# Patient Record
Sex: MALE | Race: WHITE | Employment: OTHER | URBAN - METROPOLITAN AREA
[De-identification: names, ages, dates, MRNs, and addresses within clinical notes are randomized per-mention and may not be internally consistent; named-entity substitution may affect disease eponyms.]

---

## 2017-08-29 RX ORDER — NAPROXEN 500 MG/1
TABLET ORAL
Qty: 60 TAB | Refills: 6 | Status: SHIPPED | OUTPATIENT
Start: 2017-08-29 | End: 2018-09-10 | Stop reason: SDUPTHER

## 2017-11-06 PROBLEM — N40.2 PROSTATE NODULE: Status: ACTIVE | Noted: 2017-11-06
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
## 2017-11-06 PROBLEM — H61.23 IMPACTED CERUMEN OF BOTH EARS: Status: ACTIVE | Noted: 2017-11-06

## 2017-11-06 PROBLEM — K63.5 COLON POLYPS: Status: ACTIVE | Noted: 2017-11-06

## 2017-11-06 PROBLEM — I10 HYPERTENSION: Status: ACTIVE | Noted: 2017-11-06

## 2017-11-06 PROBLEM — M25.569 KNEE PAIN: Status: ACTIVE | Noted: 2017-11-06

## 2017-11-06 PROBLEM — N41.0 ACUTE BACTERIAL PROSTATITIS: Status: ACTIVE | Noted: 2017-11-06

## 2017-11-06 RX ORDER — TADALAFIL 5 MG/1
5 TABLET ORAL
COMMUNITY
End: 2017-11-30 | Stop reason: ALTCHOICE

## 2017-11-06 RX ORDER — BISMUTH SUBSALICYLATE 262 MG
1 TABLET,CHEWABLE ORAL DAILY
COMMUNITY

## 2017-11-06 RX ORDER — LISINOPRIL 10 MG/1
TABLET ORAL DAILY
COMMUNITY
End: 2018-01-14 | Stop reason: SDUPTHER

## 2017-11-30 ENCOUNTER — OFFICE VISIT (OUTPATIENT)
Dept: INTERNAL MEDICINE CLINIC | Age: 67
End: 2017-11-30

## 2017-11-30 VITALS
HEIGHT: 71 IN | BODY MASS INDEX: 26.74 KG/M2 | DIASTOLIC BLOOD PRESSURE: 74 MMHG | HEART RATE: 65 BPM | SYSTOLIC BLOOD PRESSURE: 110 MMHG | OXYGEN SATURATION: 96 % | WEIGHT: 191 LBS

## 2017-11-30 DIAGNOSIS — I10 ESSENTIAL HYPERTENSION: Primary | ICD-10-CM

## 2017-11-30 LAB
ALBUMIN SERPL-MCNC: 4.5 G/DL (ref 3.9–5.4)
ALKALINE PHOS POC: 76 U/L (ref 38–126)
ALT SERPL-CCNC: 33 U/L (ref 9–52)
AST SERPL-CCNC: 24 U/L (ref 14–36)
BACTERIA UA POCT, BACTPOCT: NORMAL
BILIRUB UR QL STRIP: NEGATIVE
BUN BLD-MCNC: 25 MG/DL (ref 9–20)
CALCIUM BLD-MCNC: 9.9 MG/DL (ref 8.4–10.2)
CASTS UA POCT: 0
CHLORIDE BLD-SCNC: 102 MMOL/L (ref 98–107)
CHOLEST SERPL-MCNC: 197 MG/DL (ref 0–200)
CLUE CELLS, CLUEPOCT: NEGATIVE
CO2 POC: 30 MMOL/L (ref 22–32)
CREAT BLD-MCNC: 1.1 MG/DL (ref 0.8–1.5)
CRYSTALS UA POCT, CRYSPOCT: NEGATIVE
EGFR (POC): 69.1
EPITHELIAL CELLS POCT: NORMAL
GLUCOSE POC: 87 MG/DL (ref 75–110)
GLUCOSE UR-MCNC: NEGATIVE MG/DL
GRAN# POC: 5.2 K/UL (ref 2–7.8)
GRAN% POC: 71.2 % (ref 37–92)
HCT VFR BLD CALC: 47.1 % (ref 37–51)
HDLC SERPL-MCNC: 65 MG/DL (ref 35–130)
HGB BLD-MCNC: 15.9 G/DL (ref 12–18)
KETONES P FAST UR STRIP-MCNC: NEGATIVE MG/DL
LDL CHOLESTEROL POC: 120 MG/DL (ref 0–130)
LY# POC: 1.8 K/UL (ref 0.6–4.1)
LY% POC: 26 % (ref 10–58.5)
MCH RBC QN: 31.8 PG (ref 26–32)
MCHC RBC-ENTMCNC: 33.8 G/DL (ref 30–36)
MCV RBC: 94 FL (ref 80–97)
MID #, POC: 0.2 K/UL (ref 0–1.8)
MID% POC: 2.8 % (ref 0.1–24)
MUCUS UA POCT, MUCPOCT: NORMAL
PH UR STRIP: 5 [PH] (ref 5–7)
PLATELET # BLD: 230 K/UL (ref 140–440)
POTASSIUM SERPL-SCNC: 5 MMOL/L (ref 3.6–5)
PROT SERPL-MCNC: 7.2 G/DL (ref 6.3–8.2)
PROT UR QL STRIP: NEGATIVE
RBC # BLD: 5.01 M/UL (ref 4.2–6.3)
RBC UA POCT, RBCPOCT: NORMAL
SODIUM SERPL-SCNC: 142 MMOL/L (ref 137–145)
SP GR UR STRIP: 1.02 (ref 1.01–1.02)
TCHOL/HDL RATIO (POC): 3 (ref 0–4)
TOTAL BILIRUBIN POC: 0.9 MG/DL (ref 0.2–1.3)
TRICH UA POCT, TRICHPOC: NEGATIVE
TRIGL SERPL-MCNC: 60 MG/DL (ref 0–200)
UA UROBILINOGEN AMB POC: NORMAL (ref 0.2–1)
URINALYSIS CLARITY POC: CLEAR
URINALYSIS COLOR POC: NORMAL
URINE BLOOD POC: NEGATIVE
URINE CULT COMMENT, POCT: NORMAL
URINE LEUKOCYTES POC: NEGATIVE
URINE NITRITES POC: NEGATIVE
VLDLC SERPL CALC-MCNC: 12 MG/DL
WBC # BLD: 7.2 K/UL (ref 4.1–10.9)
WBC UA POCT, WBCPOCT: NORMAL
YEAST UA POCT, YEASTPOC: NEGATIVE

## 2017-11-30 NOTE — PROGRESS NOTES
Isabel Herebrt is a 79 y.o. male presenting for Complete Physical  .     1. Have you been to the ER, urgent care clinic since your last visit? Hospitalized since your last visit? No    2. Have you seen or consulted any other health care providers outside of the [address] [place] [address] since your last visit? Include any pap smears or colon screening. No    Fall Risk Assessment, last 12 mths 11/30/2017   Able to walk? Yes   Fall in past 12 months? No         Abuse Screening Questionnaire 11/30/2017   Do you ever feel afraid of your partner? N   Are you in a relationship with someone who physically or mentally threatens you? N   Is it safe for you to go home? Y       PHQ over the last two weeks 11/30/2017   Little interest or pleasure in doing things Not at all   Feeling down, depressed or hopeless Not at all   Total Score PHQ 2 0       There are no discontinued medications.

## 2017-11-30 NOTE — PROGRESS NOTES
This note will not be viewable in [address] Subjective:     Erik Moscoso is a 79 y.o. male presenting for annual comprehensive personal healthcare examination. Mr. Ronnie Carrasco presents to the office today for complete checkup. Patient is 79,  and retired. He has been in excellent health and leads an active lifestyle. Patient does have hypertension which is been well-controlled on his lisinopril. In addition he has had colon polyps for which she is up-to-date on his colonoscopy and he has had a prostate nodule evaluated by urology. The patient is up-to-date on pneumococcal and influenza vaccinations. He offers no new complaints today per the review of systems. History of present illness: This patient has multiple medical problems. These include:  Patient Active Problem List   Diagnosis Code    Acute bacterial prostatitis N41.0    Hypertension I10    Prostate nodule N40.2    Tick bite W57. Ismael Luke    Colon polyps K63.5    Knee pain M25.569    Impacted cerumen of both ears H61.23        Past Medical History:   Diagnosis Date    Acute bacterial prostatitis 11/6/2017    Hypertension 11/6/2017    Impacted cerumen of both ears 11/6/2017    Knee pain 11/6/2017    Prostate nodule 11/6/2017    Tick bite 11/6/2017     History reviewed. No pertinent surgical history. No Known Allergies  Current Outpatient Prescriptions   Medication Sig Dispense Refill    lisinopril (PRINIVIL, ZESTRIL) 10 mg tablet Take  by mouth daily.  DOCOSAHEXANOIC ACID/EPA (FISH OIL PO) Take  by mouth.  multivitamin (ONE A DAY) tablet Take 1 Tab by mouth daily.       naproxen (NAPROSYN) 500 mg tablet take 1 tablet by mouth twice a day with food 60 Tab 6     Social History     Social History    Marital status:      Spouse name: N/A    Number of children: N/A    Years of education: N/A     Social History Main Topics    Smoking status: Former Smoker    Smokeless tobacco: Never Used    Alcohol use 0.6 oz/week     1 Cans of beer per week    Drug use: No    Sexual activity: Not Asked     Other Topics Concern    None     Social History Narrative     Family History   Problem Relation Age of Onset    Cancer Mother      BREAST    No Known Problems Father        Health Maintenance   Topic Date Due    Hepatitis C Screening  1950    DTaP/Tdap/Td series (1 - Tdap) 01/22/1971    FOBT Q 1 YEAR AGE 50-75  01/22/2000    ZOSTER VACCINE AGE 60>  11/22/2009    GLAUCOMA SCREENING Q2Y  01/22/2015    MEDICARE YEARLY EXAM  01/22/2015    Influenza Age 9 to Adult  08/01/2017    Pneumococcal 65+ Low/Medium Risk  Completed       Review of Systems  Constitutional:  He denies fevers, weight loss, sweats, or fatigue. HEENT:  No blurred or double vision, headaches or dizziness. No difficulty with swallowing, taste, speech or smell. Respiratory:  No cough, wheezing or shortness of breath, or sputum production. Cardiac:  Denies chest pain, palpitations, unexplained indigestion, syncope, edema, PND or orthopnea. GI:  No changes in bowel habits, abdominal pain, no bloating, anorexia, nausea, vomiting or heartburn. :  He denies frequency, nocturia, stranguria, dysuria or sexual dysfunction. Extremities:  No joint pain, stiffness or swelling. Skin:  No recent rash or mole changes. Neurological:  No numbness, tingling, burning paresthesias or loss of motor strength. No syncope, dizziness, frequent headaches or memory loss. ROS otherwise negative       Objective:     Vitals:    11/30/17 1405   BP: 110/74   Pulse: 65   SpO2: 96%   Weight: 191 lb (86.6 kg)   Height: 5' 11\" (1.803 m)   PainSc:   0 - No pain      Body mass index is 26.64 kg/(m^2). Physical exam:   General Appearance:  Well-developed, well-nourished, no acute distress. Vision:  Deferred to ophthalmologist.    Hearing: HEENT:    Ears:  The TMs and ear canals were clear.   Eyes:  The pupillary responses were normal.  Extraocular muscle function intact. Lids and conjunctiva not injected. Funduscopic exam revealed sharp disc margins. Pharynx:  Clear with teeth in good repair. No masses were noted. Neck:  Supple without thyromegaly or adenopathy. No JVD noted. No carotid bruits. Lungs: Clear to auscultation and percussion. Cardiac:  Regular rate and rhythm. No murmur. PMI not displaced. No gallop, rub or click. Abdomen:  Flat, soft, non-tender without palpable organomegaly or mass. No pulsatile mass was felt, and no bruit was heard. Bowel sounds were active. Extremities:  No clubbing, cyanosis or edema. Pulses:  Dorsalis pedis and posterior tibial pulses felt without difficulty. Skin:  No unusual rash or mole changes noted. Lymph Nodes:  None felt in the cervical, supraclavicular, axillary or inguinal region. Neurological:  Cranial nerves II-XII grossly intact. Motor strength 5/5. DTRs 2+ and symmetric. Station and gait normal.         Assessment/Plan:   Impressions:  Diagnoses and all orders for this visit:    Essential hypertension  -     AMB POC COMPLETE CBC,AUTOMATED ENTER  -     AMB POC COMPREHENSIVE METABOLIC PANEL  -     AMB POC LIPID PROFILE  -     OH COLLECTION VENOUS BLOOD,VENIPUNCTURE  -     AMB POC URINALYSIS DIP STICK AUTO W/ MICRO         Other instructions: The patient remains in excellent health and will continue on the current lisinopril that he has been on. A low sodium controlled carb diet and exercise program are encouraged    Await labs that have been ordered    Follow-up yearly    Follow-up Disposition:  Return in about 1 year (around 11/30/2018).     Georgina Corcoran MD

## 2017-11-30 NOTE — MR AVS SNAPSHOT
Visit Information Date & Time Provider Department Dept. Phone Encounter #  
 11/30/2017  2:00 PM Huber HowellDavide [id number] Follow-up Instructions Return in about 1 year (around 11/30/2018). Upcoming Health Maintenance Date Due Hepatitis C Screening 1950 DTaP/Tdap/Td series (1 - Tdap) 1/22/1971 FOBT Q 1 YEAR AGE 50-75 1/22/2000 ZOSTER VACCINE AGE 60> 11/22/2009 GLAUCOMA SCREENING Q2Y 1/22/2015 MEDICARE YEARLY EXAM 1/22/2015 Influenza Age 5 to Adult 8/1/2017 Allergies as of 11/30/2017  Review Complete On: 11/30/2017 By: Huber Howell MD  
 No Known Allergies Current Immunizations  Never Reviewed Name Date Influenza High Dose Vaccine PF 10/26/2017, 11/17/2016, 11/12/2015 Pneumococcal Conjugate (PCV-13) 11/12/2015 Pneumococcal Polysaccharide (PPSV-23) 1/13/2017 Not reviewed this visit You Were Diagnosed With   
  
 Codes Comments Essential hypertension    -  Primary ICD-10-CM: I10 
ICD-9-CM: 401.9 Vitals BP Pulse Height(growth percentile) Weight(growth percentile) SpO2 BMI  
 110/74 (BP 1 Location: Left arm, BP Patient Position: Sitting) 65 5' 11\" (1.803 m) 191 lb (86.6 kg) 96% 26.64 kg/m2 Smoking Status Former Smoker BMI and BSA Data Body Mass Index Body Surface Area  
 26.64 kg/m 2 2.08 m 2 Your Updated Medication List  
  
   
This list is accurate as of: 11/30/17  2:43 PM.  Always use your most recent med list.  
  
  
  
  
 FISH OIL PO Take  by mouth.  
  
 lisinopril 10 mg tablet Commonly known as:  Joo Shutters Take  by mouth daily. multivitamin tablet Commonly known as:  ONE A DAY Take 1 Tab by mouth daily. naproxen 500 mg tablet Commonly known as:  NAPROSYN  
take 1 tablet by mouth twice a day with food We Performed the Following AMB POC COMPLETE CBC,AUTOMATED ENTER L010389 CPT(R)] AMB POC COMPREHENSIVE METABOLIC PANEL [82837 CPT(R)] AMB POC LIPID PROFILE [05178 CPT(R)] AMB POC URINALYSIS DIP STICK AUTO W/ MICRO  [28596 CPT(R)] FL COLLECTION VENOUS BLOOD,VENIPUNCTURE [id number] CPT(R)] Follow-up Instructions Return in about 1 year (around 11/30/2018). Introducing [facility] & HEALTH SERVICES! [facility] introduces [employer]U patient portal. Now you can access parts of your medical record, email your doctor's office, and request medication refills online. 1. In your internet browser, go to [email or web address] 2. Click on the First Time User? Click Here link in the Sign In box. You will see the New Member Sign Up page. 3. Enter your MD2U Access Code exactly as it appears below. You will not need to use this code after youve completed the sign-up process. If you do not sign up before the expiration date, you must request a new code. · MD2U Access Code: [email or web address] Expires: 2/28/2018  1:50 PM 
 
4. Enter the last four digits of your Social Security Number (xxxx) and Date of Birth (mm/dd/yyyy) as indicated and click Submit. You will be taken to the next sign-up page. 5. Create a MD2U ID. This will be your MD2U login ID and cannot be changed, so think of one that is secure and easy to remember. 6. Create a MD2U password. You can change your password at any time. 7. Enter your Password Reset Question and Answer. This can be used at a later time if you forget your password. 8. Enter your e-mail address. You will receive e-mail notification when new information is available in [address] 9. Click Sign Up. You can now view and download portions of your medical record. 10. Click the Download Summary menu link to download a portable copy of your medical information.  
 
If you have questions, please visit the Frequently Asked Questions section of the [email or web address]. Remember, [employer] is NOT to be used for urgent needs. For medical emergencies, dial 911. Now available from your iPhone and Android! Please provide this summary of care documentation to your next provider. Your primary care clinician is listed as TOÑO Nunes. If you have any questions after today's visit, please call [phone].

## 2017-11-30 NOTE — PATIENT INSTRUCTIONS
High Blood Pressure: Care Instructions  Your Care Instructions    If your blood pressure is usually above 140/90, you have high blood pressure, or hypertension. That means the top number is 140 or higher or the bottom number is 90 or higher, or both. Despite what a lot of people think, high blood pressure usually doesn't cause headaches or make you feel dizzy or lightheaded. It usually has no symptoms. But it does increase your risk for heart attack, stroke, and kidney or eye damage. The higher your blood pressure, the more your risk increases. Your doctor will give you a goal for your blood pressure. Your goal will be based on your health and your age. An example of a goal is to keep your blood pressure below 140/90. Lifestyle changes, such as eating healthy and being active, are always important to help lower blood pressure. You might also take medicine to reach your blood pressure goal.  Follow-up care is a key part of your treatment and safety. Be sure to make and go to all appointments, and call your doctor if you are having problems. It's also a good idea to know your test results and keep a list of the medicines you take. How can you care for yourself at home? Medical treatment  · If you stop taking your medicine, your blood pressure will go back up. You may take one or more types of medicine to lower your blood pressure. Be safe with medicines. Take your medicine exactly as prescribed. Call your doctor if you think you are having a problem with your medicine. · Talk to your doctor before you start taking aspirin every day. Aspirin can help certain people lower their risk of a heart attack or stroke. But taking aspirin isn't right for everyone, because it can cause serious bleeding. · See your doctor regularly. You may need to see the doctor more often at first or until your blood pressure comes down.   · If you are taking blood pressure medicine, talk to your doctor before you take decongestants or anti-inflammatory medicine, such as ibuprofen. Some of these medicines can raise blood pressure. · Learn how to check your blood pressure at home. Lifestyle changes  · Stay at a healthy weight. This is especially important if you put on weight around the waist. Losing even 10 pounds can help you lower your blood pressure. · If your doctor recommends it, get more exercise. Walking is a good choice. Bit by bit, increase the amount you walk every day. Try for at least 30 minutes on most days of the week. You also may want to swim, bike, or do other activities. · Avoid or limit alcohol. Talk to your doctor about whether you can drink any alcohol. · Try to limit how much sodium you eat to less than 2,300 milligrams (mg) a day. Your doctor may ask you to try to eat less than 1,500 mg a day. · Eat plenty of fruits (such as bananas and oranges), vegetables, legumes, whole grains, and low-fat dairy products. · Lower the amount of saturated fat in your diet. Saturated fat is found in animal products such as milk, cheese, and meat. Limiting these foods may help you lose weight and also lower your risk for heart disease. · Do not smoke. Smoking increases your risk for heart attack and stroke. If you need help quitting, talk to your doctor about stop-smoking programs and medicines. These can increase your chances of quitting for good. When should you call for help? Call 911 anytime you think you may need emergency care. This may mean having symptoms that suggest that your blood pressure is causing a serious heart or blood vessel problem. Your blood pressure may be over 180/110. ? For example, call 911 if:  ? · You have symptoms of a heart attack. These may include:  ¨ Chest pain or pressure, or a strange feeling in the chest.  ¨ Sweating. ¨ Shortness of breath. ¨ Nausea or vomiting.   ¨ Pain, pressure, or a strange feeling in the back, neck, jaw, or upper belly or in one or both shoulders or arms.  ¨ Lightheadedness or sudden weakness. ¨ A fast or irregular heartbeat. ? · You have symptoms of a stroke. These may include:  ¨ Sudden numbness, tingling, weakness, or loss of movement in your face, arm, or leg, especially on only one side of your body. ¨ Sudden vision changes. ¨ Sudden trouble speaking. ¨ Sudden confusion or trouble understanding simple statements. ¨ Sudden problems with walking or balance. ¨ A sudden, severe headache that is different from past headaches. ? · You have severe back or belly pain. ?Do not wait until your blood pressure comes down on its own. Get help right away. ?Call your doctor now or seek immediate care if:  ? · Your blood pressure is much higher than normal (such as 180/110 or higher), but you don't have symptoms. ? · You think high blood pressure is causing symptoms, such as:  ¨ Severe headache. ¨ Blurry vision. ? Watch closely for changes in your health, and be sure to contact your doctor if:  ? · Your blood pressure measures 140/90 or higher at least 2 times. That means the top number is 140 or higher or the bottom number is 90 or higher, or both. ? · You think you may be having side effects from your blood pressure medicine. ? · Your blood pressure is usually normal, but it goes above normal at least 2 times. Where can you learn more? Go to http://steven-jose luis.info/. Enter F249 in the search box to learn more about \"High Blood Pressure: Care Instructions. \"  Current as of: September 21, 2016  Content Version: 11.4  © 7568-1346 [employer]. Care instructions adapted under license by [employer] (which disclaims liability or warranty for this information). If you have questions about a medical condition or this instruction, always ask your healthcare professional. [place] [address] any warranty or liability for your use of this information.

## 2017-12-21 ENCOUNTER — OFFICE VISIT (OUTPATIENT)
Dept: INTERNAL MEDICINE CLINIC | Age: 67
End: 2017-12-21

## 2017-12-21 VITALS
WEIGHT: 188 LBS | HEART RATE: 61 BPM | OXYGEN SATURATION: 97 % | DIASTOLIC BLOOD PRESSURE: 88 MMHG | TEMPERATURE: 98.5 F | SYSTOLIC BLOOD PRESSURE: 150 MMHG | HEIGHT: 71 IN | BODY MASS INDEX: 26.32 KG/M2

## 2017-12-21 DIAGNOSIS — J20.9 ACUTE BRONCHITIS, UNSPECIFIED ORGANISM: Primary | ICD-10-CM

## 2017-12-21 RX ORDER — CEFUROXIME AXETIL 500 MG/1
500 TABLET ORAL 2 TIMES DAILY
Qty: 14 TAB | Refills: 0 | Status: SHIPPED | OUTPATIENT
Start: 2017-12-21 | End: 2018-01-24 | Stop reason: ALTCHOICE

## 2017-12-21 NOTE — MR AVS SNAPSHOT
Visit Information Date & Time Provider Department Dept. Phone Encounter #  
 12/21/2017 10:45 AM Oskar Nolan NP [address] [facility] [phone] [id number] Follow-up Instructions Return if symptoms worsen or fail to improve. Upcoming Health Maintenance Date Due Hepatitis C Screening 1950 DTaP/Tdap/Td series (1 - Tdap) 1/22/1971 FOBT Q 1 YEAR AGE 50-75 1/22/2000 ZOSTER VACCINE AGE 60> 11/22/2009 GLAUCOMA SCREENING Q2Y 1/22/2015 MEDICARE YEARLY EXAM 1/22/2015 Allergies as of 12/21/2017  Review Complete On: 12/21/2017 By: Oskar Nolan NP No Known Allergies Current Immunizations  Never Reviewed Name Date Influenza High Dose Vaccine PF 10/26/2017, 11/17/2016, 11/12/2015 Pneumococcal Conjugate (PCV-13) 11/12/2015 Pneumococcal Polysaccharide (PPSV-23) 1/13/2017 Not reviewed this visit Vitals BP Pulse Temp Height(growth percentile) Weight(growth percentile) SpO2  
 150/88 (BP 1 Location: Left arm, BP Patient Position: Sitting) 61 98.5 °F (36.9 °C) (Oral) 5' 11\" (1.803 m) 188 lb (85.3 kg) 97% BMI Smoking Status 26.22 kg/m2 Former Smoker BMI and BSA Data Body Mass Index Body Surface Area  
 26.22 kg/m 2 2.07 m 2 Your Updated Medication List  
  
   
This list is accurate as of: 12/21/17 12:31 PM.  Always use your most recent med list.  
  
  
  
  
 cefUROXime 500 mg tablet Commonly known as:  CEFTIN Take 1 Tab by mouth two (2) times a day. FISH OIL PO Take  by mouth.  
  
 lisinopril 10 mg tablet Commonly known as:  Marysol Jody Take  by mouth daily. multivitamin tablet Commonly known as:  ONE A DAY Take 1 Tab by mouth daily. naproxen 500 mg tablet Commonly known as:  NAPROSYN  
take 1 tablet by mouth twice a day with food Follow-up Instructions Return if symptoms worsen or fail to improve. Patient Instructions Bronchitis: Care Instructions Your Care Instructions Bronchitis is inflammation of the bronchial tubes, which carry air to the lungs. The tubes swell and produce mucus, or phlegm. The mucus and inflamed bronchial tubes make you cough. You may have trouble breathing. Most cases of bronchitis are caused by viruses like those that cause colds. Antibiotics usually do not help and they may be harmful. Bronchitis usually develops rapidly and lasts about 2 to 3 weeks in otherwise healthy people. Follow-up care is a key part of your treatment and safety. Be sure to make and go to all appointments, and call your doctor if you are having problems. It's also a good idea to know your test results and keep a list of the medicines you take. How can you care for yourself at home? · Take all medicines exactly as prescribed. Call your doctor if you think you are having a problem with your medicine. · Get some extra rest. 
· Take an over-the-counter pain medicine, such as acetaminophen (Tylenol), ibuprofen (Advil, Motrin), or naproxen (Aleve) to reduce fever and relieve body aches. Read and follow all instructions on the label. · Do not take two or more pain medicines at the same time unless the doctor told you to. Many pain medicines have acetaminophen, which is Tylenol. Too much acetaminophen (Tylenol) can be harmful. · Take an over-the-counter cough medicine that contains dextromethorphan to help quiet a dry, hacking cough so that you can sleep. Avoid cough medicines that have more than one active ingredient. Read and follow all instructions on the label. · Breathe moist air from a humidifier, hot shower, or sink filled with hot water. The heat and moisture will thin mucus so you can cough it out. · Do not smoke. Smoking can make bronchitis worse. If you need help quitting, talk to your doctor about stop-smoking programs and medicines. These can increase your chances of quitting for good. When should you call for help? Call 911 anytime you think you may need emergency care. For example, call if: 
? · You have severe trouble breathing. ?Call your doctor now or seek immediate medical care if: 
? · You have new or worse trouble breathing. ? · You cough up dark brown or bloody mucus (sputum). ? · You have a new or higher fever. ? · You have a new rash. ? Watch closely for changes in your health, and be sure to contact your doctor if: 
? · You cough more deeply or more often, especially if you notice more mucus or a change in the color of your mucus. ? · You are not getting better as expected. Where can you learn more? Go to http://steven-jose luis.info/. Enter H333 in the search box to learn more about \"Bronchitis: Care Instructions. \" Current as of: May 12, 2017 Content Version: 11.4 © 2053-8277 [employer]. Care instructions adapted under license by [employer] (which disclaims liability or warranty for this information). If you have questions about a medical condition or this instruction, always ask your healthcare professional. [place] [address] any warranty or liability for your use of this information. Introducing [facility] & HE[facility]! [facility] introduces [employer] patient portal. Now you can access parts of your medical record, email your doctor's office, and request medication refills online. 1. In your internet browser, go to [email or web address] 2. Click on the First Time User? Click Here link in the Sign In box. You will see the New Member Sign Up page. 3. Enter your [employer] Access Code exactly as it appears below. You will not need to use this code after youve completed the sign-up process. If you do not sign up before the expiration date, you must request a new code. · [employer] Access Code:  [email or web address] 
 Expires: 2/28/2018  1:50 PM 
 
4. Enter the last four digits of your Social Security Number (xxxx) and Date of Birth (mm/dd/yyyy) as indicated and click Submit. You will be taken to the next sign-up page. 5. Create a [employer] ID. This will be your [employer] login ID and cannot be changed, so think of one that is secure and easy to remember. 6. Create a [employer] password. You can change your password at any time. 7. Enter your Password Reset Question and Answer. This can be used at a later time if you forget your password. 8. Enter your e-mail address. You will receive e-mail notification when new information is available in [address] 9. Click Sign Up. You can now view and download portions of your medical record. 10. Click the Download Summary menu link to download a portable copy of your medical information. If you have questions, please visit the Frequently Asked Questions section of the [employer] website. Remember, [employer] is NOT to be used for urgent needs. For medical emergencies, dial 911. Now available from your iPhone and Android! Please provide this summary of care documentation to your next provider. Your primary care clinician is listed as TOÑO Manzo 21. If you have any questions after today's visit, please call [phone].

## 2017-12-21 NOTE — PROGRESS NOTES
Subjective:  Mr. Francisca Croft is a pleasant 79year old gentleman, patient of Dr. Catrachito Andre, who comes in for evaluation of productive cough of yellowish sputum. He denies any shortness of breath or wheezing. All of his difficulties started last week after he traveled on a plane. His appetite has been poor, but he's had no nausea or vomiting. Denies any past history of asthma or pneumonia. He has used some Mucinex, which has helped minimally. Denies any chills or fever. Physical Examination:  GENERAL:  On exam he is a pleasant gentleman in no acute distress. He is alert and oriented. VITALS:  BP:  150/88. P: 61 and regular. O2 sat: 97%. T: 98.5. HEENT:  Normocephalic, atraumatic. TMs normal.  Mouth mucosa pink. Tongue midline. Pharynx minimally injected without presence of exudates. No sinus tenderness. NECK:  Supple without adenopathy. CHEST:  Lungs were clear to auscultation, no rales or wheezes. CARDIAC:  Heart regular rhythm without murmur or gallop. EXTREMITIES:  No edema or calf tenderness. Distal pulses were present. Impression:  1. Acute bronchitis. Plan:  1. It was opted to start him on Ceftin 500 mg twice daily for seven days. He may continue with Mucinex as needed. 2. He is to increase fluids and rest.  3. He will contact us should he fail to improve or if his condition worsens.

## 2017-12-21 NOTE — PROGRESS NOTES
Luke  presents with   Chief Complaint   Patient presents with    Cough   Patient of Dr Taran Harding here with complaint of a cough, nasal drainage, & no appetite since traveling last week on a plane to [place]. Mucinex not helping. No recorded fever but states he feels \"feverish. \"    1. Have you been to the ER, urgent care clinic since your last visit? Hospitalized since your last visit? No    2. Have you seen or consulted any other health care providers outside of the [address] [place] [address] since your last visit? Include any pap smears or colon screening.  No

## 2018-01-15 RX ORDER — LISINOPRIL 10 MG/1
TABLET ORAL
Qty: 30 TAB | Refills: 6 | Status: SHIPPED | OUTPATIENT
Start: 2018-01-15 | End: 2019-02-09 | Stop reason: SDUPTHER

## 2018-01-24 ENCOUNTER — OFFICE VISIT (OUTPATIENT)
Dept: INTERNAL MEDICINE CLINIC | Age: 68
End: 2018-01-24

## 2018-01-24 VITALS
DIASTOLIC BLOOD PRESSURE: 70 MMHG | OXYGEN SATURATION: 99 % | SYSTOLIC BLOOD PRESSURE: 126 MMHG | HEIGHT: 71 IN | BODY MASS INDEX: 26.94 KG/M2 | HEART RATE: 70 BPM | WEIGHT: 192.4 LBS

## 2018-01-24 DIAGNOSIS — H25.11 CATARACT, NUCLEAR SCLEROTIC SENILE, RIGHT: ICD-10-CM

## 2018-01-24 DIAGNOSIS — I10 ESSENTIAL HYPERTENSION: ICD-10-CM

## 2018-01-24 DIAGNOSIS — Z01.818 PREOPERATIVE CLEARANCE: Primary | ICD-10-CM

## 2018-01-24 NOTE — MR AVS SNAPSHOT
[address] [address] [address] [address] [phone] Patient: Robyn Pruitt MRN: [id number] [facility] [place][birth year][place] Visit Information Date & Time Provider Department Dept. Phone Encounter #  
 1/24/2018  3:00 PM Christiano Leos MD [place] [address] [place] [address] [phone] [id number] Follow-up Instructions Return for As previously scheduled. Upcoming Health Maintenance Date Due Hepatitis C Screening 1950 DTaP/Tdap/Td series (1 - Tdap) 1/22/1971 FOBT Q 1 YEAR AGE 50-75 1/22/2000 ZOSTER VACCINE AGE 60> 11/22/2009 GLAUCOMA SCREENING Q2Y 1/22/2015 MEDICARE YEARLY EXAM 1/22/2015 Allergies as of 1/24/2018  Review Complete On: 1/24/2018 By: Christiano Leos MD  
  
 Severity Noted Reaction Type Reactions Ciprofloxacin  01/24/2018    Other (comments) Joint pain Current Immunizations  Never Reviewed Name Date Influenza High Dose Vaccine PF 10/26/2017, 11/17/2016, 11/12/2015 Pneumococcal Conjugate (PCV-13) 11/12/2015 Pneumococcal Polysaccharide (PPSV-23) 1/13/2017 Not reviewed this visit You Were Diagnosed With   
  
 Codes Comments Preoperative clearance    -  Primary ICD-10-CM: H33.312 ICD-9-CM: V72.84 Cataract, nuclear sclerotic senile, right     ICD-10-CM: H25.11 ICD-9-CM: 366.16 Essential hypertension     ICD-10-CM: I10 
ICD-9-CM: 401.9 Vitals BP Pulse Height(growth percentile) Weight(growth percentile) SpO2 BMI  
 126/70 (BP 1 Location: Right arm, BP Patient Position: Sitting) 70 5' 11\" (1.803 m) 192 lb 6.4 oz (87.3 kg) 99% 26.83 kg/m2 Smoking Status Former Smoker BMI and BSA Data Body Mass Index Body Surface Area  
 26.83 kg/m 2 2.09 m 2 Preferred Pharmacy Pharmacy Name Phone [phone] [phone] Your Updated Medication List  
  
   
This list is accurate as of: 1/24/18  3:49 PM.  Always use your most recent med list.  
  
  
  
  
 FISH OIL PO Take  by mouth.  
  
 lisinopril 10 mg tablet Commonly known as:  PRINIVIL, ZESTRIL  
take 1/2 tablet by mouth once daily  
  
 multivitamin tablet Commonly known as:  ONE A DAY Take 1 Tab by mouth daily. naproxen 500 mg tablet Commonly known as:  NAPROSYN  
take 1 tablet by mouth twice a day with food Follow-up Instructions Return for As previously scheduled. Patient Instructions Cataracts: Care Instructions Your Care Instructions A cataract is a clouding of the lens of the eye. The lens focuses light on the retina at the back of the eye. Cataracts block some of the light and make it harder for you to see clearly. Cataracts often develop when you get older. Most cataracts grow slowly. At first, you may just need stronger glasses to help you see better. Later, if the cataracts grow and begin to seriously impair your vision, you can have surgery to remove them. Follow-up care is a key part of your treatment and safety. Be sure to make and go to all appointments, and call your doctor if you are having problems. It's also a good idea to know your test results and keep a list of the medicines you take. How can you care for yourself at home? · Move room lights and use window shades to avoid glare. · Use more lighting or higher-watt bulbs. · Use a magnifying glass for reading. Look for large-print books and other reading material to make reading more enjoyable. · Have your eyes checked regularly, and update your glasses when needed. · Wear sunglasses to block out harmful sunlight. Buy sunglasses that screen out ultraviolet A and B (UVA and UVB) rays. · Do not smoke. Smoking can make cataracts worse. If you need help quitting, talk to your doctor about stop-smoking programs and medicines. These can increase your chances of quitting for good. When should you call for help? Watch closely for changes in your health, and be sure to contact your doctor if: 
? · Your vision is getting worse. ? · You have increasing trouble doing everyday tasks, like driving or reading the newspaper, because of your eyesight. Where can you learn more? Go to http://steven-jose luis.info/. Enter P916 in the search box to learn more about \"Cataracts: Care Instructions. \" Current as of: March 3, 2017 Content Version: 11.4 © 3179-5061 Healthwise, Incorporated. Care instructions adapted under license by [employer] (which disclaims liability or warranty for this information). If you have questions about a medical condition or this instruction, always ask your healthcare professional. [address] any warranty or liability for your use of this information. Introducing [facility]! [employer] introduces [employer] patient portal. Now you can access parts of your medical record, email your doctor's office, and request medication refills online. 1. In your internet browser, go to [email or web address] 2. Click on the First Time User? Click Here link in the Sign In box. You will see the New Member Sign Up page. 3. Enter your [employer] Access Code exactly as it appears below. You will not need to use this code after youve completed the sign-up process. If you do not sign up before the expiration date, you must request a new code. · [employer] Access Code: [email or web address] Expires: 2/28/2018  1:50 PM 
 
4. Enter the last four digits of your Social Security Number (xxxx) and Date of Birth (mm/dd/yyyy) as indicated and click Submit. You will be taken to the next sign-up page. 5. Create a [employer] ID. This will be your [employer] login ID and cannot be changed, so think of one that is secure and easy to remember. 6. Create a B[employer] password. You can change your password at any time. 7. Enter your Password Reset Question and Answer. This can be used at a later time if you forget your password. 8. Enter your e-mail address. You will receive e-mail notification when new information is available in [address] 9. Click Sign Up. You can now view and download portions of your medical record. 10. Click the Download Summary menu link to download a portable copy of your medical information. If you have questions, please visit the Frequently Asked Questions section of the [employer] website. Remember, [employer] is NOT to be used for urgent needs. For medical emergencies, dial 911. Now available from your iPhone and Android! Please provide this summary of care documentation to your next provider. Your primary care clinician is listed as TOÑO Nunes. If you have any questions after today's visit, please call [phone].

## 2018-01-24 NOTE — PROGRESS NOTES
Ward Mendez is a 76 y.o. male presenting for Pre-op Exam  .     1. Have you been to the ER, urgent care clinic since your last visit? Hospitalized since your last visit? No    2. Have you seen or consulted any other health care providers outside of the [facility] since your last visit? Include any pap smears or colon screening. No    Fall Risk Assessment, last 12 mths 11/30/2017   Able to walk? Yes   Fall in past 12 months? No         Abuse Screening Questionnaire 11/30/2017   Do you ever feel afraid of your partner? N   Are you in a relationship with someone who physically or mentally threatens you? N   Is it safe for you to go home? Y       PHQ over the last two weeks 11/30/2017   Little interest or pleasure in doing things Not at all   Feeling down, depressed or hopeless Not at all   Total Score PHQ 2 0       There are no discontinued medications.

## 2018-01-24 NOTE — PROGRESS NOTES
This note will not be viewable in [address] Ginger Miller is a 76 y.o. male referred to our office today by Dr. Trish Lopez in medical consultation for preoperative medical clearance prior to having right eye cataract surgery performed on February 20 at the [facility] [place]. The patient gives a history of having had a cataract in his right eye for at least 5 years but over the last year he has had a gradual change in his visual acuity with blurring of vision. The patient notes that the left eye has a cataract but has not given him as much problems. His eye history is also pertinent for previous retinal detachment in the right eye which was repaired. The patient denies any history of glaucoma or macular degeneration and denies any eye pain or discharge. the patient does have hypertension which is been well-controlled on his lisinopril. He has no history of heart or lung disease and specifically denies any exertional chest pains or claudication. Allergies include Cipro but he denies allergies to latex or Band-Aid adhesive. He has never had problems with anesthesia. The review of systems is otherwise negative is noted. Latex Allergy:NO    History of anesthesia reaction: NO:     History of PE/DVT:NO:       Past Medical History:   Diagnosis Date    Acute bacterial prostatitis 11/6/2017    Hypertension 11/6/2017    Impacted cerumen of both ears 11/6/2017    Knee pain 11/6/2017    Prostate nodule 11/6/2017    Tick bite 11/6/2017     History reviewed. No pertinent surgical history. Allergies   Allergen Reactions    Ciprofloxacin Other (comments)     Joint pain     Current Outpatient Prescriptions   Medication Sig Dispense Refill    lisinopril (PRINIVIL, ZESTRIL) 10 mg tablet take 1/2 tablet by mouth once daily 30 Tab 6    DOCOSAHEXANOIC ACID/EPA (FISH OIL PO) Take  by mouth.  multivitamin (ONE A DAY) tablet Take 1 Tab by mouth daily.       naproxen (NAPROSYN) 500 mg tablet take 1 tablet by mouth twice a day with food 60 Tab 6     Social History     Social History    Marital status:      Spouse name: N/A    Number of children: N/A    Years of education: N/A     Social History Main Topics    Smoking status: Former Smoker    Smokeless tobacco: Never Used    Alcohol use 0.6 oz/week     1 Cans of beer per week    Drug use: No    Sexual activity: Not Asked     Other Topics Concern    None     Social History Narrative     Family History   Problem Relation Age of Onset    Cancer Mother      BREAST    No Known Problems Father        Reviewed PmHx, RxHx, FmHx, SocHx, AllgHx and updated and dated in the chart. Review of Systems  Constitutional: negative for fevers, chills, anorexia and weight loss  Eyes:   Positive for decreased visual acuity and blurring of vision  ENT:   negative for tinnitus,sore throat,nasal congestion,ear pain,hoarseness  Respiratory:  negative for cough, hemoptysis, dyspnea,wheezing  CV:   negative for chest pain, palpitations, lower extremity edema  GI:   negative for nausea, vomiting, diarrhea, abdominal pain,melena  Endo:               negative for polyuria,polydipsia,polyphagia,heat intolerance  Genitourinary: negative for frequency, dysuria and hematuria  Integumentary: negative for rash and pruritus  Hematologic:  negative for easy bruising and gum/nose bleeding  Musculoskel: negative for myalgias, arthralgias, back pain, muscle weakness, joint pain  Neurological:  negative for headaches, dizziness, vertigo, memory problems and gait   Behavl/Psych: negative for feelings of anxiety, depression, mood changes      Objective:     Vitals:    01/24/18 1455   BP: 126/70   Pulse: 70   SpO2: 99%   Weight: 192 lb 6.4 oz (87.3 kg)   Height: 5' 11\" (1.803 m)     Body mass index is 26.83 kg/(m^2).     Physical Examination: General appearance - alert, well appearing, and in no distress and oriented to person, place, and time  Mental status - alert, oriented to person, place, and time, normal mood, behavior, speech, dress, motor activity, and thought processes  Eyes - pupils equal and reactive, extraocular eye movements intact, sclera anicteric, Lids without erythema or swelling. No discharge eye redness or discharge noted  Ears - bilateral TM's and external ear canals normal, right ear normal, left ear normal  Mouth - mucous membranes moist, pharynx normal without lesions and tongue normal  Neck - supple, no significant adenopathy  Lymphatics - no palpable lymphadenopathy  Chest - clear to auscultation, no wheezes, rales or rhonchi,   Heart - normal rate, regular rhythm, normal S1, S2, no murmurs, rubs, clicks or gallops  Abdomen - soft, nontender, nondistended, no masses or organomegaly  Back exam - full range of motion, no tenderness, palpable spasm or pain on motion  Neurological - alert, oriented, normal speech, no focal findings or movement disorder noted, neck supple without rigidity, cranial nerves II through XII intact, DTR's normal and symmetric, motor and sensory grossly normal bilaterally  Musculoskeletal - no joint tenderness, deformity or swelling  Extremities - peripheral pulses normal, no pedal edema, no clubbing or cyanosis  Skin - normal coloration and turgor, no rashes, no suspicious skin lesions noted  Physical exam otherwise negative    Assessment/ Plan:   Diagnoses and all orders for this visit:    Preoperative clearance    Cataract, nuclear sclerotic senile, right    Essential hypertension        Other instructions:  Patient's medications are reviewed and reconciled. No change in his current medical regimen is made. The patient is medically stable, at low cardiac risk, and cleared for the surgery as scheduled for February 20. Follow-up here as previously scheduled    Follow-up Disposition:  Return for As previously scheduled. I have discussed the diagnosis with the patient and the intended plan as seen in the above orders.   The patient has received an after-visit summary and questions were answered concerning future plans. Pt conveyed understanding of plan.     Lady Still MD

## 2018-01-24 NOTE — PATIENT INSTRUCTIONS
Cataracts: Care Instructions  Your Care Instructions    A cataract is a clouding of the lens of the eye. The lens focuses light on the retina at the back of the eye. Cataracts block some of the light and make it harder for you to see clearly. Cataracts often develop when you get older. Most cataracts grow slowly. At first, you may just need stronger glasses to help you see better. Later, if the cataracts grow and begin to seriously impair your vision, you can have surgery to remove them. Follow-up care is a key part of your treatment and safety. Be sure to make and go to all appointments, and call your doctor if you are having problems. It's also a good idea to know your test results and keep a list of the medicines you take. How can you care for yourself at home? · Move room lights and use window shades to avoid glare. · Use more lighting or higher-watt bulbs. · Use a magnifying glass for reading. Look for large-print books and other reading material to make reading more enjoyable. · Have your eyes checked regularly, and update your glasses when needed. · Wear sunglasses to block out harmful sunlight. Buy sunglasses that screen out ultraviolet A and B (UVA and UVB) rays. · Do not smoke. Smoking can make cataracts worse. If you need help quitting, talk to your doctor about stop-smoking programs and medicines. These can increase your chances of quitting for good. When should you call for help? Watch closely for changes in your health, and be sure to contact your doctor if:  ? · Your vision is getting worse. ? · You have increasing trouble doing everyday tasks, like driving or reading the newspaper, because of your eyesight. Where can you learn more? Go to http://steven-jose luis.info/. Enter P916 in the search box to learn more about \"Cataracts: Care Instructions. \"  Current as of: March 3, 2017  Content Version: 11.4  © 7914-6410 Healthwise, Incorporated.  Care instructions adapted under license by [employer] (which disclaims liability or warranty for this information). If you have questions about a medical condition or this instruction, always ask your healthcare professional. [address] any warranty or liability for your use of this information.

## 2018-01-30 ENCOUNTER — TELEPHONE (OUTPATIENT)
Dept: INTERNAL MEDICINE CLINIC | Age: 68
End: 2018-01-30

## 2018-01-30 RX ORDER — SULFAMETHOXAZOLE AND TRIMETHOPRIM 800; 160 MG/1; MG/1
1 TABLET ORAL 2 TIMES DAILY
Qty: 14 TAB | Refills: 0 | Status: SHIPPED | OUTPATIENT
Start: 2018-01-30 | End: 2018-02-06

## 2018-01-30 NOTE — TELEPHONE ENCOUNTER
Patient would like an antibiotic(something other than cipro)  for his [facility] trip next week. if approved patient would like to  at the

## 2018-09-11 RX ORDER — NAPROXEN 500 MG/1
TABLET ORAL
Qty: 60 TAB | Refills: 3 | Status: SHIPPED | OUTPATIENT
Start: 2018-09-11 | End: 2019-05-30 | Stop reason: SDUPTHER

## 2018-10-04 ENCOUNTER — TELEPHONE (OUTPATIENT)
Dept: INTERNAL MEDICINE CLINIC | Age: 68
End: 2018-10-04

## 2018-10-04 NOTE — TELEPHONE ENCOUNTER
Dentist office requiring a letter written stating Atb prior to dental cleaning is no longer recommended for his MVP.     Fax to [phone]

## 2018-12-06 ENCOUNTER — OFFICE VISIT (OUTPATIENT)
Dept: INTERNAL MEDICINE CLINIC | Age: 68
End: 2018-12-06

## 2018-12-06 VITALS
HEIGHT: 71 IN | DIASTOLIC BLOOD PRESSURE: 80 MMHG | SYSTOLIC BLOOD PRESSURE: 124 MMHG | WEIGHT: 185 LBS | HEART RATE: 68 BPM | OXYGEN SATURATION: 97 % | BODY MASS INDEX: 25.9 KG/M2

## 2018-12-06 DIAGNOSIS — I10 ESSENTIAL HYPERTENSION: ICD-10-CM

## 2018-12-06 DIAGNOSIS — N40.2 PROSTATE NODULE: ICD-10-CM

## 2018-12-06 DIAGNOSIS — Z11.59 NEED FOR HEPATITIS C SCREENING TEST: ICD-10-CM

## 2018-12-06 DIAGNOSIS — K63.5 POLYP OF COLON, UNSPECIFIED PART OF COLON, UNSPECIFIED TYPE: ICD-10-CM

## 2018-12-06 DIAGNOSIS — Z00.00 INITIAL MEDICARE ANNUAL WELLNESS VISIT: Primary | ICD-10-CM

## 2018-12-06 LAB
BACTERIA UA POCT, BACTPOCT: NORMAL
BILIRUB UR QL STRIP: NEGATIVE
CASTS UA POCT: NORMAL
CLUE CELLS, CLUEPOCT: NEGATIVE
CRYSTALS UA POCT, CRYSPOCT: NEGATIVE
EPITHELIAL CELLS POCT: NEGATIVE
GLUCOSE UR-MCNC: NEGATIVE MG/DL
GRAN# POC: 6.8 K/UL (ref 2–7.8)
GRAN% POC: 74.2 % (ref 37–92)
HCT VFR BLD CALC: 49.1 % (ref 37–51)
HGB BLD-MCNC: 16 G/DL (ref 12–18)
KETONES P FAST UR STRIP-MCNC: NORMAL MG/DL
LY# POC: 1.9 K/UL (ref 0.6–4.1)
LY% POC: 21.4 % (ref 10–58.5)
MCH RBC QN: 30.5 PG (ref 26–32)
MCHC RBC-ENTMCNC: 32.5 G/DL (ref 30–36)
MCV RBC: 94 FL (ref 80–97)
MID #, POC: 0.4 K/UL (ref 0–1.8)
MID% POC: 4.4 % (ref 0.1–24)
MUCUS UA POCT, MUCPOCT: NORMAL
PH UR STRIP: 5 [PH] (ref 5–7)
PLATELET # BLD: 359 K/UL (ref 140–440)
PROT UR QL STRIP: NEGATIVE
RBC # BLD: 5.23 M/UL (ref 4.2–6.3)
RBC UA POCT, RBCPOCT: 0
SP GR UR STRIP: 1.02 (ref 1.01–1.02)
TRICH UA POCT, TRICHPOC: NEGATIVE
UA UROBILINOGEN AMB POC: NORMAL (ref 0.2–1)
URINALYSIS CLARITY POC: CLEAR
URINALYSIS COLOR POC: YELLOW
URINE BLOOD POC: NEGATIVE
URINE CULT COMMENT, POCT: NORMAL
URINE LEUKOCYTES POC: NEGATIVE
URINE NITRITES POC: NEGATIVE
WBC # BLD: 9.1 K/UL (ref 4.1–10.9)
WBC UA POCT, WBCPOCT: 0
YEAST UA POCT, YEASTPOC: NEGATIVE

## 2018-12-06 NOTE — PROGRESS NOTES
Chief Complaint Patient presents with [facility] Annual Wellness Visit Depression Risk Factor Screening: PHQ over the last two weeks 12/6/2018 Little interest or pleasure in doing things Not at all Feeling down, depressed, irritable, or hopeless Not at all Total Score PHQ 2 0 Functional Ability and Level of Safety: Activities of Daily Living ADL Assessment 12/6/2018 Feeding yourself No Help Needed Getting from bed to chair No Help Needed Getting dressed No Help Needed Bathing or showering No Help Needed Walk across the room (includes cane/walker) No Help Needed Using the telphone No Help Needed Taking your medications No Help Needed Preparing meals No Help Needed Managing money (expenses/bills) No Help Needed Moderately strenuous housework (laundry) No Help Needed Shopping for personal items (toiletries/medicines) No Help Needed Shopping for groceries No Help Needed Driving No Help Needed Climbing a flight of stairs No Help Needed Getting to places beyond walking distances No Help Needed Fall Risk Fall Risk Assessment, last 12 mths 12/6/2018 Able to walk? Yes Fall in past 12 months? No  
 
 
Abuse Screen Abuse Screening Questionnaire 11/30/2017 Do you ever feel afraid of your partner? Anne Prater Are you in a relationship with someone who physically or mentally threatens you? Anne Prater Is it safe for you to go home? Lemuel Gregg Patient Care Team  
Patient Care Team: 
Malissa Thomas MD as PCP - General (Internal Medicine) Sil Parks MD (Urology) Melody Rodriguez MD (Dermatology) Linda Howell MD (Ophthalmology)

## 2018-12-06 NOTE — PROGRESS NOTES
This note will not be viewable in [address] Michelle Villalpando is a 76 y.o. male who presents for an Initial Medicare Annual Wellness Exam (AWV) and follow up of chronic medical conditions. The patient has not had a Medicare wellness exam done within the last year I have reviewed the patient's medical history in detail and updated the computerized patient record. History Subjective: 
Mr. Omari Draper is a 20-year-old man who presents the office today for Medicare wellness check plus follow-up of his medical issues The patient has hypertension and remains on lisinopril. He is tolerating this without cough, swelling, rash, dizziness or lower extremity edema. He denies any headaches, numbness, tingling or focal neurological problems. Patient has a history of a prostate nodule. He has been fully evaluated by urology and had a PSA that was less than 1 recently. He has been cleared by them and he is to return for follow-up on a yearly basis with this. There is been no urinary flow problems. The patient is otherwise been in good health with no other active medical issues. Patient Active Problem List  
Diagnosis Code  Acute bacterial prostatitis N41.0  Hypertension I10  
 Prostate nodule N40.2  Tick bite W57. Reji Hossein  Colon polyps K63.5  Knee pain M25.569  Impacted cerumen of both ears H61.23 Patient Care Team: 
Lisa Baez MD as PCP - General (Internal Medicine) Davon Lopez MD (Urology) Amie Kemp MD (Dermatology) Jeffery Colorado MD (Ophthalmology) Past Medical History:  
Diagnosis Date  Acute bacterial prostatitis 11/6/2017  Hypertension 11/6/2017  Impacted cerumen of both ears 11/6/2017  Knee pain 11/6/2017  Prostate nodule 11/6/2017  Tick bite 11/6/2017 History reviewed. No pertinent surgical history. Allergies Allergen Reactions  Ciprofloxacin Other (comments) Joint pain Current Outpatient Medications Medication Sig Dispense Refill  naproxen (NAPROSYN) 500 mg tablet TAKE 1 TABLET TWICE DAILY WITH FOOD 60 Tab 3  
 lisinopril (PRINIVIL, ZESTRIL) 10 mg tablet take 1/2 tablet by mouth once daily 30 Tab 6  
 DOCOSAHEXANOIC ACID/EPA (FISH OIL PO) Take  by mouth.  multivitamin (ONE A DAY) tablet Take 1 Tab by mouth daily. Social History Socioeconomic History  Marital status:  Spouse name: Not on file  Number of children: Not on file  Years of education: Not on file  Highest education level: Not on file Tobacco Use  Smoking status: Former Smoker  Smokeless tobacco: Never Used Substance and Sexual Activity  Alcohol use: Yes Alcohol/week: 0.6 oz Types: 1 Cans of beer per week  Drug use: No  
 
Family History Problem Relation Age of Onset  Cancer Mother BREAST  No Known Problems Father Health Maintenance Topic Date Due  
 Hepatitis C Screening  1950  Shingrix Vaccine Age 50> (1 of 2) 01/22/2000  
 FOBT Q 1 YEAR AGE 50-75  01/22/2000  MEDICARE YEARLY EXAM  03/14/2018  GLAUCOMA SCREENING Q2Y  07/12/2020  
 DTaP/Tdap/Td series (2 - Td) 02/15/2025  Pneumococcal 65+ Low/Medium Risk  Completed  Influenza Age 5 to Adult  Completed Review of Systems Constitutional: negative for fevers, chills, anorexia and weight loss Eyes:   negative for visual disturbance and irritation ENT:   negative for tinnitus,sore throat,nasal congestion,ear pain,hoarseness Respiratory:  negative for cough, hemoptysis, dyspnea,wheezing CV:   negative for chest pain, palpitations, lower extremity edema GI:   negative for nausea, vomiting, diarrhea, abdominal pain,melena Endo:               negative for polyuria,polydipsia,polyphagia,heat intolerance Genitourinary: negative for frequency, dysuria and hematuria Integumentary: negative for rash and pruritus Hematologic:  negative for easy bruising and gum/nose bleeding Musculoskel: negative for myalgias, arthralgias, back pain, muscle weakness, joint pain Neurological:  negative for headaches, dizziness, vertigo, memory problems and gait Behavl/Psych: negative for feelings of anxiety, depression, mood changes ROS otherwise negative Depression Risk Factor Screening: PHQ over the last two weeks 12/6/2018 Little interest or pleasure in doing things Not at all Feeling down, depressed, irritable, or hopeless Not at all Total Score PHQ 2 0 Alcohol Risk Factor Screening: You do not drink alcohol or very rarely. Functional Ability and Level of Safety:  
Hearing Loss Hearing is good. Activities of Daily Living ADL Assessment 12/6/2018 Feeding yourself No Help Needed Getting from bed to chair No Help Needed Getting dressed No Help Needed Bathing or showering No Help Needed Walk across the room (includes cane/walker) No Help Needed Using the telphone No Help Needed Taking your medications No Help Needed Preparing meals No Help Needed Managing money (expenses/bills) No Help Needed Moderately strenuous housework (laundry) No Help Needed Shopping for personal items (toiletries/medicines) No Help Needed Shopping for groceries No Help Needed Driving No Help Needed Climbing a flight of stairs No Help Needed Getting to places beyond walking distances No Help Needed Fall Risk Fall Risk Assessment, last 12 mths 12/6/2018 Able to walk? Yes Fall in past 12 months? No  
 
 
Abuse Screen Abuse Screening Questionnaire 11/30/2017 Do you ever feel afraid of your partner? Rosario Deutsch Are you in a relationship with someone who physically or mentally threatens you? Rosario Deutsch Is it safe for you to go home? Bill Chan Cognitive Screening Evaluation of Cognitive Function: 
Has your family/caregiver stated any concerns about your memory: no 
 
Physical Exam  
Visit Vitals /80 Pulse 68 Ht 5' 11\" (1.803 m) Wt 185 lb (83.9 kg) SpO2 97% BMI 25.80 kg/m² Body mass index is 25.8 kg/m². General appearance - alert, well appearing, and in no distress Mental status - alert, oriented to person, place, and time EYE-ANTON, EOMI,conjunctiva normal bilaterally, lids normal 
ENT-ENT exam normal, no neck nodes or sinus tenderness Nose - normal and patent, no erythema,  Or discharge Mouth - mucous membranes moist, pharynx normal without lesions Neck - supple, no significant adenopathy or bruit Chest - clear to auscultation, no wheezes, rales or rhonchi. Heart - normal rate, regular rhythm, normal S1, S2, no murmurs, rubs, clicks or gallops Abdomen - soft, nontender, nondistended, no masses or organomegaly Lymph- no adenopathy palpable Ext-peripheral pulses normal, no pedal edema, no clubbing or cyanosis Skin-Warm and dry. no hyperpigmentation, vitiligo, or suspicious lesions Neuro -alert, oriented, normal speech, no focal findings or movement disorder noted Assessment/Plan  
[place] education and counseling provided: 
Age appropriate evidence-based preventive care recommendations based on today's review and evaluation; including relevant cancer screening guidelines, and vaccination recommendations. An After Visit Summary was printed and given to the patient which information about these guidelines, and a personalized schedule for health maintenance items. Whe appropriate and with patient agreement, orders noted below were placed to complete missing health maintenance items. Additional Plan for follow up chronic medical conditions includes: 
Diagnoses and all orders for this visit: 
 
Initial Medicare annual wellness visit Essential hypertension -     AMB POC COMPLETE CBC,AUTOMATED ENTER 
-     COLLECTION VENOUS BLOOD,VENIPUNCTURE 
-     AMB POC URINALYSIS DIP STICK AUTO W/ MICRO  
-     LIPID PANEL 
-     METABOLIC PANEL, COMPREHENSIVE Prostate nodule Polyp of colon, unspecified part of colon, unspecified type Need for hepatitis C screening test 
-     HEPATITIS C AB Other instructions: The patient's medications are reviewed and reconciled. No change in his current medical regimen is made. Body mass index is 25.8 and dietary counseling along with printed patient education is given Advanced care planning discussion occurred today Health maintenance issues were reviewed. CDC recommended hepatitis C screening is performed today. The patient is up-to-date on colonoscopy and we will obtain a copy of his last study from colorectal surgery Age-appropriate vaccinations were reviewed and he is up-to-date except for shingles vaccine which is recommended. Await results of multiple labs Follow-up yearly Follow-up Disposition: 
Return in about 1 year (around 12/6/2019). I have reviewed with the patient details of the assessment and plan and all questions were answered. Relevent patient education was performed. The most recent lab findings were reviewed with the patient.  
 
Idalmis Norton MD

## 2018-12-06 NOTE — PATIENT INSTRUCTIONS
The best way to stay healthy is to live a healthy lifestyle. A healthy lifestyle includes regular exercise, eating a well-balanced diet, keeping a healthy weight and not smoking. Regular physical exams and screening tests are another important way to take care of yourself. Preventive exams provided by health care providers can find health problems early when treatment works best and can keep you from getting certain diseases or illnesses. Preventive services include exams, lab tests, screenings, shots, monitoring and information to help you take care of your own health. All people over 65 should have a pneumonia shot. Pneumonia shots are usually only needed once in a lifetime unless your doctor decides differently. In addition to your physical exam, some screening tests are recommended: 
 
All people over 65 should have a yearly flu shot. People over 65 are at medium to high risk for Hepatitis B. Three shots are needed for complete protection. Bone mass measurement (dexa scan) is recommended every two years. Diabetes Mellitus screening is recommended every year. Glaucoma is an eye disease caused by high pressure in the eye. An eye exam is recommended every year. Cardiovascular screening tests that check your cholesterol and other blood fat (lipid) levels are recommended every five years. Colorectal Cancer screening tests help to find pre-cancerous polyps (growths in the colon) so they can be removed before they turn into cancer. Tests ordered for screening depend on your personal and family history risk factors. Prostate Cancer Screening (annually up to age 76) Screening for breast cancer is recommended yearly with a Mammogram. 
 
Screening for cervical and vaginal cancer is recommended with a pelvic and Pap test every two years.  However if you have had an abnormal pap in the past  three years or at high risk for cervical or vaginal cancer Medicare will cover a pap test and a pelvic exam every year. Here is a list of your current Health Maintenance items with a due date: 
Health Maintenance Due Topic Date Due  
 Hepatitis C Test  1950  Shingles Vaccine (1 of 2) 01/22/2000  Stool testing for trace blood  01/22/2000 [place] Annual Well Visit  03/14/2018 Learning About Cutting Calories How do calories affect your weight? Food gives your body energy. Energy from the food you eat is measured in calories. This energy keeps your heart beating, your brain active, and your muscles working. Your body needs a certain number of calories each day. After your body uses the calories it needs, it stores extra calories as fat. To lose weight safely, you have to eat fewer calories while eating in a healthy way. How many calories do you need each day? The more active you are, the more calories you need. When you are less active, you need fewer calories. How many calories you need each day also depends on several things, including your age and whether you are male or female. Here are some general guidelines for adults: 
· Less active women and older adults need 1,600 to 2,000 calories each day. · Active women and less active men need 2,000 to 2,400 calories each day. · Active men need 2,400 to 3,000 calories each day. How can you cut calories and eat healthy meals? Whole grains, vegetables and fruits, and dried beans are good lower-calorie foods. They give you lots of nutrients and fiber. And they fill you up. Sweets, energy drinks, and soda pop are high in calories. They give you few nutrients and no fiber. Try to limit soda pop, fruit juice, and energy drinks. Drink water instead. Some fats can be part of a healthy diet. But cutting back on fats from highly processed foods like fast foods and many snack foods is a good way to lower the calories in your diet.  Also, use smaller amounts of fats like butter, margarine, salad dressing, and mayonnaise. Add fresh garlic, lemon, or herbs to your meals to add flavor without adding fat. Meats and dairy products can be a big source of hidden fats. Try to choose lean or low-fat versions of these products. Fat-free cookies, candies, chips, and frozen treats can still be high in sugar and calories. Some fat-free foods have more calories than regular ones. Eat fat-free treats in moderation, as you would other foods. If your favorite foods are high in fat, salt, sugar, or calories, limit how often you eat them. Eat smaller servings, or look for healthy substitutes. Fill up on fruits, vegetables, and whole grains. Eating at home · Use meat as a side dish instead of as the main part of your meal. 
· Try main dishes that use whole wheat pasta, brown rice, dried beans, or vegetables. · Find ways to cook with little or no fat, such as broiling, steaming, or grilling. · Use cooking spray instead of oil. If you use oil, use a monounsaturated oil, such as canola or olive oil. · Trim fat from meats before you cook them. · Drain off fat after you brown the meat or while you roast it. · Chill soups and stews after you cook them. Then skim the fat off the top after it hardens. Eating out · Order foods that are broiled or poached rather than fried or breaded. · Cut back on the amount of butter or margarine that you use on bread. · Order sauces, gravies, and salad dressings on the side, and use only a little. · When you order pasta, choose tomato sauce rather than cream sauce. · Ask for salsa with your baked potato instead of sour cream, butter, cheese, or bell. · Order meals in a small size instead of upgrading to a large. · Share an entree, or take part of your food home to eat as another meal. 
· Share appetizers and desserts. Where can you learn more? Go to http://ra.info/. Enter 99 [id number] in the search box to learn more about \"Learning About Cutting Calories. \" Current as of: March 29, 2018 Content Version: 11.8 © 8482-0112 [employer]. Care instructions adapted under license by [employer] (which disclaims liability or warranty for this information). If you have questions about a medical condition or this instruction, always ask your healthcare professional. [place] [address] any warranty or liability for your use of this information. Advance Directives: Care Instructions Your Care Instructions An advance directive is a legal way to state your wishes at the end of your life. It tells your family and your doctor what to do if you can no longer say what you want. There are two main types of advance directives. You can change them any time that your wishes change. · A living will tells your family and your doctor your wishes about life support and other treatment. · A durable power of  for health care lets you name a person to make treatment decisions for you when you can't speak for yourself. This person is called a health care agent. If you do not have an advance directive, decisions about your medical care may be made by a doctor or a  who doesn't know you. It may help to think of an advance directive as a gift to the people who care for you. If you have one, they won't have to make tough decisions by themselves. Follow-up care is a key part of your treatment and safety. Be sure to make and go to all appointments, and call your doctor if you are having problems. It's also a good idea to know your test results and keep a list of the medicines you take. How can you care for yourself at home? · Discuss your wishes with your loved ones and your doctor. This way, there are no surprises. · Many states have a unique form.  Or you might use a universal form that has been approved by many states. This kind of form can sometimes be completed and stored online. Your electronic copy will then be available wherever you have a connection to the Internet. In most cases, doctors will respect your wishes even if you have a form from a different state. · You don't need a  to do an advance directive. But you may want to get legal advice. · Think about these questions when you prepare an advance directive: 
? Who do you want to make decisions about your medical care if you are not able to? Many people choose a family member or close friend. ? Do you know enough about life support methods that might be used? If not, talk to your doctor so you understand. ? What are you most afraid of that might happen? You might be afraid of having pain, losing your independence, or being kept alive by machines. ? Where would you prefer to die? Choices include your home, a hospital, or a nursing home. ? Would you like to have information about hospice care to support you and your family? ? Do you want to donate organs when you die? ? Do you want certain Holiness practices performed before you die? If so, put your wishes in the advance directive. · Read your advance directive every year, and make changes as needed. When should you call for help? Be sure to contact your doctor if you have any questions. Where can you learn more? Go to http://steven-jose luis.info/. Enter R264 in the search box to learn more about \"Advance Directives: Care Instructions. \" Current as of: April 19, 2018 Content Version: 11.8 © 8351-8970 [employer]. Care instructions adapted under license by [employer] (which disclaims liability or warranty for this information).  If you have questions about a medical condition or this instruction, always ask your healthcare professional. [address] any warranty or liability for your use of this information.

## 2018-12-07 LAB
ALBUMIN SERPL-MCNC: 4.6 G/DL (ref 3.6–4.8)
ALBUMIN/GLOB SERPL: 2 {RATIO} (ref 1.2–2.2)
ALP SERPL-CCNC: 75 IU/L (ref 39–117)
ALT SERPL-CCNC: 22 IU/L (ref 0–44)
AST SERPL-CCNC: 22 IU/L (ref 0–40)
BILIRUB SERPL-MCNC: 0.7 MG/DL (ref 0–1.2)
BUN SERPL-MCNC: 23 MG/DL (ref 8–27)
BUN/CREAT SERPL: 20 (ref 10–24)
CALCIUM SERPL-MCNC: 9.6 MG/DL (ref 8.6–10.2)
CHLORIDE SERPL-SCNC: 101 MMOL/L (ref 96–106)
CHOLEST SERPL-MCNC: 177 MG/DL (ref 100–199)
CO2 SERPL-SCNC: 26 MMOL/L (ref 20–29)
CREAT SERPL-MCNC: 1.13 MG/DL (ref 0.76–1.27)
GLOBULIN SER CALC-MCNC: 2.3 G/DL (ref 1.5–4.5)
GLUCOSE SERPL-MCNC: 92 MG/DL (ref 65–99)
HCV AB S/CO SERPL IA: <0.1 S/CO RATIO (ref 0–0.9)
HDLC SERPL-MCNC: 57 MG/DL
LDLC SERPL CALC-MCNC: 108 MG/DL (ref 0–99)
POTASSIUM SERPL-SCNC: 4.3 MMOL/L (ref 3.5–5.2)
PROT SERPL-MCNC: 6.9 G/DL (ref 6–8.5)
SODIUM SERPL-SCNC: 141 MMOL/L (ref 134–144)
TRIGL SERPL-MCNC: 58 MG/DL (ref 0–149)
VLDLC SERPL CALC-MCNC: 12 MG/DL (ref 5–40)

## 2019-02-10 RX ORDER — LISINOPRIL 10 MG/1
TABLET ORAL
Qty: 30 TAB | Refills: 1 | Status: SHIPPED | OUTPATIENT
Start: 2019-02-10 | End: 2019-08-12 | Stop reason: SDUPTHER

## 2019-05-30 RX ORDER — NAPROXEN 500 MG/1
TABLET ORAL
Qty: 60 TAB | Refills: 0 | Status: SHIPPED | OUTPATIENT
Start: 2019-05-30 | End: 2019-07-12 | Stop reason: SDUPTHER

## 2019-07-13 RX ORDER — NAPROXEN 500 MG/1
TABLET ORAL
Qty: 60 TAB | Refills: 0 | Status: SHIPPED | OUTPATIENT
Start: 2019-07-13 | End: 2019-08-31 | Stop reason: SDUPTHER

## 2019-08-12 RX ORDER — LISINOPRIL 10 MG/1
TABLET ORAL
Qty: 60 TAB | Refills: 0 | Status: SHIPPED | OUTPATIENT
Start: 2019-08-12 | End: 2019-11-15 | Stop reason: SDUPTHER

## 2019-09-01 RX ORDER — NAPROXEN 500 MG/1
TABLET ORAL
Qty: 60 TAB | Refills: 0 | Status: SHIPPED | OUTPATIENT
Start: 2019-09-01 | End: 2019-09-27 | Stop reason: SDUPTHER

## 2019-09-27 RX ORDER — NAPROXEN 500 MG/1
TABLET ORAL
Qty: 60 TAB | Refills: 0 | Status: SHIPPED | OUTPATIENT
Start: 2019-09-27 | End: 2019-11-15 | Stop reason: SDUPTHER

## 2019-11-15 ENCOUNTER — ANESTHESIA (OUTPATIENT)
Dept: ENDOSCOPY | Age: 69
End: 2019-11-15
Payer: MEDICARE

## 2019-11-15 ENCOUNTER — ANESTHESIA EVENT (OUTPATIENT)
Dept: ENDOSCOPY | Age: 69
End: 2019-11-15
Payer: MEDICARE

## 2019-11-15 ENCOUNTER — HOSPITAL ENCOUNTER (OUTPATIENT)
Age: 69
Setting detail: OUTPATIENT SURGERY
Discharge: HOME | End: 2019-11-15
Attending: SPECIALIST | Admitting: SPECIALIST
Payer: MEDICARE

## 2019-11-15 VITALS
HEART RATE: 62 BPM | DIASTOLIC BLOOD PRESSURE: 72 MMHG | TEMPERATURE: 97.6 F | SYSTOLIC BLOOD PRESSURE: 108 MMHG | RESPIRATION RATE: 16 BRPM | WEIGHT: 181.38 LBS | OXYGEN SATURATION: 100 % | HEIGHT: 71 IN | BODY MASS INDEX: 25.39 KG/M2

## 2019-11-15 PROCEDURE — 74011000250 HC RX REV CODE- 250: Performed by: NURSE ANESTHETIST, CERTIFIED REGISTERED

## 2019-11-15 PROCEDURE — 76040000019: Performed by: SPECIALIST

## 2019-11-15 PROCEDURE — 76060000031 HC ANESTHESIA FIRST 0.5 HR: Performed by: SPECIALIST

## 2019-11-15 PROCEDURE — 74011250636 HC RX REV CODE- 250/636: Performed by: SPECIALIST

## 2019-11-15 PROCEDURE — 74011250637 HC RX REV CODE- 250/637: Performed by: SPECIALIST

## 2019-11-15 PROCEDURE — 74011250636 HC RX REV CODE- 250/636: Performed by: NURSE ANESTHETIST, CERTIFIED REGISTERED

## 2019-11-15 RX ORDER — PROPOFOL 10 MG/ML
INJECTION, EMULSION INTRAVENOUS AS NEEDED
Status: DISCONTINUED | OUTPATIENT
Start: 2019-11-15 | End: 2019-11-15 | Stop reason: HOSPADM

## 2019-11-15 RX ORDER — SODIUM CHLORIDE 9 MG/ML
50 INJECTION, SOLUTION INTRAVENOUS CONTINUOUS
Status: DISCONTINUED | OUTPATIENT
Start: 2019-11-15 | End: 2019-11-15 | Stop reason: HOSPADM

## 2019-11-15 RX ORDER — NAPROXEN 500 MG/1
TABLET ORAL
Qty: 60 TAB | Refills: 0 | Status: SHIPPED | OUTPATIENT
Start: 2019-11-15 | End: 2020-02-11 | Stop reason: ALTCHOICE

## 2019-11-15 RX ORDER — LISINOPRIL 10 MG/1
TABLET ORAL
Qty: 60 TAB | Refills: 0 | Status: SHIPPED | OUTPATIENT
Start: 2019-11-15 | End: 2019-12-13 | Stop reason: SDUPTHER

## 2019-11-15 RX ORDER — LIDOCAINE HYDROCHLORIDE 20 MG/ML
INJECTION, SOLUTION EPIDURAL; INFILTRATION; INTRACAUDAL; PERINEURAL AS NEEDED
Status: DISCONTINUED | OUTPATIENT
Start: 2019-11-15 | End: 2019-11-15 | Stop reason: HOSPADM

## 2019-11-15 RX ORDER — SODIUM CHLORIDE 0.9 % (FLUSH) 0.9 %
5-40 SYRINGE (ML) INJECTION EVERY 8 HOURS
Status: DISCONTINUED | OUTPATIENT
Start: 2019-11-15 | End: 2019-11-15 | Stop reason: HOSPADM

## 2019-11-15 RX ORDER — SODIUM CHLORIDE 0.9 % (FLUSH) 0.9 %
5-40 SYRINGE (ML) INJECTION AS NEEDED
Status: DISCONTINUED | OUTPATIENT
Start: 2019-11-15 | End: 2019-11-15 | Stop reason: HOSPADM

## 2019-11-15 RX ORDER — DEXTROMETHORPHAN/PSEUDOEPHED 2.5-7.5/.8
1.2 DROPS ORAL
Status: DISCONTINUED | OUTPATIENT
Start: 2019-11-15 | End: 2019-11-15 | Stop reason: HOSPADM

## 2019-11-15 RX ADMIN — PROPOFOL 100 MG: 10 INJECTION, EMULSION INTRAVENOUS at 09:01

## 2019-11-15 RX ADMIN — PROPOFOL 100 MG: 10 INJECTION, EMULSION INTRAVENOUS at 09:07

## 2019-11-15 RX ADMIN — PROPOFOL 80 MG: 10 INJECTION, EMULSION INTRAVENOUS at 09:12

## 2019-11-15 RX ADMIN — SODIUM CHLORIDE 50 ML/HR: 900 INJECTION, SOLUTION INTRAVENOUS at 08:21

## 2019-11-15 RX ADMIN — SIMETHICONE 80 MG: 20 SUSPENSION/ DROPS ORAL at 09:15

## 2019-11-15 RX ADMIN — LIDOCAINE HYDROCHLORIDE 40 MG: 20 INJECTION, SOLUTION EPIDURAL; INFILTRATION; INTRACAUDAL; PERINEURAL at 09:00

## 2019-11-15 NOTE — PROGRESS NOTES
..Anesthesia reports 280mg Propofol, 40mg Lidocaine and 400mL NS given during procedure. Received report from anesthesia staff on vital signs and status of patient.

## 2019-11-15 NOTE — PROCEDURES
Colonoscopy Procedure Note    Indications:   Screening colonoscopy    Referring Physician: Adelaide Florian MD  Anesthesia/Sedation: MAC anesthesia Propofol  Endoscopist:  Dr. Stefany Alvarado    Procedure in Detail:  Informed consent was obtained for the procedure, including sedation. Risks of perforation, hemorrhage, adverse drug reaction, and aspiration were discussed. The patient was placed in the left lateral decubitus position. Based on the pre-procedure assessment, including review of the patient's medical history, medications, allergies, and review of systems, he had been deemed to be an appropriate candidate for moderate sedation; he was therefore sedated with the medications listed above. The patient was monitored continuously with ECG tracing, pulse oximetry, blood pressure monitoring, and direct observations. A rectal examination was performed. The [id number] was inserted into the rectum and advanced under direct vision to the cecum, which was identified by the ileocecal valve and appendiceal orifice. The quality of the colonic preparation was adequate. A careful inspection was made as the colonoscope was withdrawn, including a retroflexed view of the rectum; findings and interventions are described below. Appropriate photodocumentation was obtained. Findings:     1. Scope advanced to the cecum. 2.  Preparation was adequate. 3.  Normal mucosa visualized throughout. 4.  Mild sigmoid diverticulosis. 5.  Small internal hemorrhoids. Therapies:  none    Specimen:  none     Complications: None were encountered during the procedure. EBL: < 10 ml.     Recommendations:   -Repeat Colonoscopy in 10 years    Signed By: Denise Jordan MD                        November 15, 2019

## 2019-11-15 NOTE — H&P
Gastroenterology Outpatient History and Physical    [facility]    Physician: Eulogio Bella MD    Vital Signs: Blood pressure 127/86, pulse 63, temperature 98 °F (36.7 °C), resp. rate 16, height 5' 11\" (1.803 m), weight 82.3 kg (181 lb 6 oz), SpO2 99 %. Allergies: Allergies   Allergen Reactions    Ciprofloxacin Other (comments)     Joint pain       Chief Complaint: Screening    History of Present Illness: 70 yo WM for CRC screening. Justification for Procedure: above    History:  Past Medical History:   Diagnosis Date    Acute bacterial prostatitis 11/6/2017    Hypertension 11/6/2017    Impacted cerumen of both ears 11/6/2017    Knee pain 11/6/2017    Prostate nodule 11/6/2017    Tick bite 11/6/2017    History reviewed. No pertinent surgical history. Social History     Socioeconomic History    Marital status:      Spouse name: Not on file    Number of children: Not on file    Years of education: Not on file    Highest education level: Not on file   Tobacco Use    Smoking status: Former Smoker    Smokeless tobacco: Never Used   Substance and Sexual Activity    Alcohol use: Yes     Alcohol/week: 1.0 standard drinks     Types: 1 Cans of beer per week    Drug use: No      Family History   Problem Relation Age of Onset    Cancer Mother         BREAST    No Known Problems Father        Medications:   Prior to Admission medications    Medication Sig Start Date End Date Taking? Authorizing Provider   naproxen (NAPROSYN) 500 mg tablet TAKE 1 TABLET BY MOUTH TWICE DAILY WITH FOOD 9/27/19  Yes Souleymane Moore MD   lisinopril (PRINIVIL, ZESTRIL) 10 mg tablet TAKE 1/2 TABLET BY MOUTH EVERY DAY 8/12/19  Yes Souleymane Moore MD   DOCOSAHEXANOIC ACID/EPA (FISH OIL PO) Take  by mouth. Yes Provider, Historical   multivitamin (ONE A DAY) tablet Take 1 Tab by mouth daily.    Yes Provider, Historical       Physical Exam:   General: alert, no distress   HEENT: Head: Normocephalic, no lesions, without obvious abnormality.    Heart: regular rate and rhythm, S1, S2 normal, no murmur, click, rub or gallop   Lungs: chest clear, no wheezing, rales, normal symmetric air entry   Abd: soft, NTND +BS   Neurological: Grossly normal   Extremities: extremities normal, atraumatic, no cyanosis or edema     Findings/Diagnosis: Screening    Plan of Care/Planned Procedure: Colonoscopy

## 2019-11-15 NOTE — ANESTHESIA PREPROCEDURE EVALUATION
Relevant Problems   No relevant active problems       Anesthetic History   No history of anesthetic complications            Review of Systems / Medical History  Patient summary reviewed, nursing notes reviewed and pertinent labs reviewed    Pulmonary  Within defined limits                 Neuro/Psych   Within defined limits           Cardiovascular    Hypertension              Exercise tolerance: >4 METS     GI/Hepatic/Renal  Within defined limits              Endo/Other  Within defined limits           Other Findings              Physical Exam    Airway  Mallampati: I  TM Distance: 4 - 6 cm  Neck ROM: normal range of motion   Mouth opening: Normal     Cardiovascular  Regular rate and rhythm,  S1 and S2 normal,  no murmur, click, rub, or gallop             Dental  No notable dental hx    Comments: Chipped upper right incisor   Pulmonary  Breath sounds clear to auscultation               Abdominal  GI exam deferred       Other Findings            Anesthetic Plan    ASA: 2  Anesthesia type: MAC            Anesthetic plan and risks discussed with: Patient

## 2019-11-15 NOTE — DISCHARGE INSTRUCTIONS
Tran Farrisjennifer  [id number]  1950    COLON DISCHARGE INSTRUCTIONS  Discomfort:  Redness at IV site- apply warm compress to area; if redness or soreness persist- contact your physician  There may be a slight amount of blood passed from the rectum  Gaseous discomfort- walking, belching will help relieve any discomfort  You may not operate a vehicle for 12 hours  You may not engage in an occupation involving machinery or appliances for rest of today  You may not drink alcoholic beverages for at least 12 hours  Avoid making any critical decisions for at least 24 hour  DIET:   Regular diet. - however -  remember your colon is empty and a heavy meal will produce gas. Avoid these foods:  vegetables, fried / greasy foods, carbonated drinks for today. MEDICATIONS:        Regarding Aspirin or Nonsteroidal medications, please see below. ACTIVITY:  You may resume your normal daily activities it is recommended that you spend the remainder of the day resting -  avoid any strenuous activity. CALL M.D.   ANY SIGN OF:  Increasing pain, nausea, vomiting  Abdominal distension (swelling)  New increased bleeding (oral or rectal)  Fever (chills)    Follow-up Instructions:   Call Dr. Rhoda Desouza for question about procedure at telephone #  [phone]              Repeat Colonoscopy in 10 years

## 2019-11-15 NOTE — PROGRESS NOTES
Avni Landon  1950  [id number]    Situation:  Verbal report received from: Collin Bella RN  Procedure: Procedure(s):  COLONOSCOPY    Background:    Preoperative diagnosis: PERSONAL HX OF COLONIC POLYPS, FAMILY HX OF MALIGNANT NEOPLASM OF GASTROINTESTINAL TRACT  Postoperative diagnosis: diverticulosis, hemorrhoids    :  Dr. Rochelle Sanz  Assistant(s): Endoscopy Technician-1: Ángel Rick  Endoscopy RN-1: Fabián Ledesma RN    Specimens: * No specimens in log *  H. Pylori  no    Assessment:  Intra-procedure medications   Anesthesia gave intra-procedure sedation and medications, see anesthesia flow sheet yes    Intravenous fluids: NS@ KVO     Vital signs stable      Abdominal assessment: round and soft      Recommendation:  Discharge patient per MD order .   Family or Friend    Permission to share finding with family or friend yes

## 2019-12-09 ENCOUNTER — OFFICE VISIT (OUTPATIENT)
Dept: INTERNAL MEDICINE CLINIC | Age: 69
End: 2019-12-09

## 2019-12-09 VITALS
OXYGEN SATURATION: 98 % | HEIGHT: 71 IN | DIASTOLIC BLOOD PRESSURE: 80 MMHG | BODY MASS INDEX: 26.46 KG/M2 | RESPIRATION RATE: 14 BRPM | WEIGHT: 189 LBS | HEART RATE: 65 BPM | SYSTOLIC BLOOD PRESSURE: 122 MMHG | TEMPERATURE: 98.1 F

## 2019-12-09 DIAGNOSIS — K63.5 POLYP OF COLON, UNSPECIFIED PART OF COLON, UNSPECIFIED TYPE: ICD-10-CM

## 2019-12-09 DIAGNOSIS — N40.2 PROSTATE NODULE: ICD-10-CM

## 2019-12-09 DIAGNOSIS — N39.0 URINARY TRACT INFECTION WITHOUT HEMATURIA, SITE UNSPECIFIED: ICD-10-CM

## 2019-12-09 DIAGNOSIS — I10 ESSENTIAL HYPERTENSION: ICD-10-CM

## 2019-12-09 DIAGNOSIS — Z00.00 MEDICARE ANNUAL WELLNESS VISIT, SUBSEQUENT: Primary | ICD-10-CM

## 2019-12-09 LAB
A-G RATIO,AGRAT: 1.5 RATIO
ALBUMIN SERPL-MCNC: 4.5 G/DL (ref 3.9–5.4)
ALP SERPL-CCNC: 91 U/L (ref 38–126)
ALT SERPL-CCNC: 20 U/L (ref 0–50)
ANION GAP SERPL CALC-SCNC: 13 MMOL/L
AST SERPL W P-5'-P-CCNC: 25 U/L (ref 14–36)
BACTERIA,BACTU: ABNORMAL
BILIRUB SERPL-MCNC: 0.8 MG/DL (ref 0.2–1.3)
BILIRUB UR QL: NEGATIVE
BUN SERPL-MCNC: 23 MG/DL (ref 9–20)
BUN/CREATININE RATIO,BUCR: 21 RATIO
CALCIUM SERPL-MCNC: 10.1 MG/DL (ref 8.4–10.2)
CHLORIDE SERPL-SCNC: 100 MMOL/L (ref 98–107)
CHOL/HDL RATIO,CHHD: 3 RATIO (ref 0–4)
CHOLEST SERPL-MCNC: 199 MG/DL (ref 0–200)
CLARITY: CLEAR
CO2 SERPL-SCNC: 30 MMOL/L (ref 22–32)
COLOR UR: ABNORMAL
CREAT SERPL-MCNC: 1.1 MG/DL (ref 0.8–1.5)
GLOBULIN,GLOB: 3.1
GLUCOSE 24H UR-MRATE: NEGATIVE G/(24.H)
GLUCOSE SERPL-MCNC: 104 MG/DL (ref 75–110)
HDLC SERPL-MCNC: 60 MG/DL (ref 35–130)
HGB UR QL STRIP: NEGATIVE
KETONES UR QL STRIP.AUTO: NEGATIVE
LDL/HDL RATIO,LDHD: 2 RATIO
LDLC SERPL CALC-MCNC: 124 MG/DL (ref 0–130)
LEUKOCYTE ESTERASE: ABNORMAL
NITRITE UR QL STRIP.AUTO: NEGATIVE
PH UR STRIP: 6 [PH] (ref 5–7)
POTASSIUM SERPL-SCNC: 5.3 MMOL/L (ref 3.6–5)
PROT SERPL-MCNC: 7.6 G/DL (ref 6.3–8.2)
PROT UR STRIP-MCNC: NEGATIVE MG/DL
PSA, TEST22: 17.9 NG/ML (ref 0–4)
RBC #/AREA URNS HPF: ABNORMAL #/HPF
SODIUM SERPL-SCNC: 143 MMOL/L (ref 137–145)
SP GR UR REFRACTOMETRY: 1.02 (ref 1–1.03)
TRIGL SERPL-MCNC: 74 MG/DL (ref 0–200)
UROBILINOGEN UR QL STRIP.AUTO: NEGATIVE
VLDLC SERPL CALC-MCNC: 15 MG/DL
WBC URNS QL MICRO: ABNORMAL #/HPF

## 2019-12-09 NOTE — PATIENT INSTRUCTIONS
Learning About Cutting Calories How do calories affect your weight? Food gives your body energy. Energy from the food you eat is measured in calories. This energy keeps your heart beating, your brain active, and your muscles working. Your body needs a certain number of calories each day. After your body uses the calories it needs, it stores extra calories as fat. To lose weight safely, you have to eat fewer calories while eating in a healthy way. How many calories do you need each day? The more active you are, the more calories you need. When you are less active, you need fewer calories. How many calories you need each day also depends on several things, including your age and whether you are male or female. Here are some general guidelines for adults: 
· Less active women and older adults need 1,600 to 2,000 calories each day. · Active women and less active men need 2,000 to 2,400 calories each day. · Active men need 2,400 to 3,000 calories each day. How can you cut calories and eat healthy meals? Whole grains, vegetables and fruits, and dried beans are good lower-calorie foods. They give you lots of nutrients and fiber. And they fill you up. Sweets, energy drinks, and soda pop are high in calories. They give you few nutrients and no fiber. Try to limit soda pop, fruit juice, and energy drinks. Drink water instead. Some fats can be part of a healthy diet. But cutting back on fats from highly processed foods like fast foods and many snack foods is a good way to lower the calories in your diet. Also, use smaller amounts of fats like butter, margarine, salad dressing, and mayonnaise. Add fresh garlic, lemon, or herbs to your meals to add flavor without adding fat. Meats and dairy products can be a big source of hidden fats. Try to choose lean or low-fat versions of these products.  
Fat-free cookies, candies, chips, and frozen treats can still be high in sugar and calories. Some fat-free foods have more calories than regular ones. Eat fat-free treats in moderation, as you would other foods. If your favorite foods are high in fat, salt, sugar, or calories, limit how often you eat them. Eat smaller servings, or look for healthy substitutes. Fill up on fruits, vegetables, and whole grains. Eating at home · Use meat as a side dish instead of as the main part of your meal. 
· Try main dishes that use whole wheat pasta, brown rice, dried beans, or vegetables. · Find ways to cook with little or no fat, such as broiling, steaming, or grilling. · Use cooking spray instead of oil. If you use oil, use a monounsaturated oil, such as canola or olive oil. · Trim fat from meats before you cook them. · Drain off fat after you brown the meat or while you roast it. · Chill soups and stews after you cook them. Then skim the fat off the top after it hardens. Eating out · Order foods that are broiled or poached rather than fried or breaded. · Cut back on the amount of butter or margarine that you use on bread. · Order sauces, gravies, and salad dressings on the side, and use only a little. · When you order pasta, choose tomato sauce rather than cream sauce. · Ask for salsa with your baked potato instead of sour cream, butter, cheese, or bell. · Order meals in a small size instead of upgrading to a large. · Share an entree, or take part of your food home to eat as another meal. 
· Share appetizers and desserts. Where can you learn more? Go to http://steven-jose luis.info/. Enter 99 [id number] in the search box to learn more about \"Learning About Cutting Calories. \" Current as of: November 7, 2018 Content Version: 12.2 © 8672-3591 [employer]. Care instructions adapted under license by [employer] (which disclaims liability or warranty for this information).  If you have questions about a medical condition or this instruction, always ask your healthcare professional. [place] [address] any warranty or liability for your use of this information. The best way to stay healthy is to live a healthy lifestyle. A healthy lifestyle includes regular exercise, eating a well-balanced diet, keeping a healthy weight and not smoking. Regular physical exams and screening tests are another important way to take care of yourself. Preventive exams provided by health care providers can find health problems early when treatment works best and can keep you from getting certain diseases or illnesses. Preventive services include exams, lab tests, screenings, shots, monitoring and information to help you take care of your own health. All people over 65 should have a pneumonia shot. Pneumonia shots are usually only needed once in a lifetime unless your doctor decides differently. In addition to your physical exam, some screening tests are recommended: 
 
All people over 65 should have a yearly flu shot. People over 65 are at medium to high risk for Hepatitis B. Three shots are needed for complete protection. Bone mass measurement (dexa scan) is recommended every two years. Diabetes Mellitus screening is recommended every year. Glaucoma is an eye disease caused by high pressure in the eye. An eye exam is recommended every year. Cardiovascular screening tests that check your cholesterol and other blood fat (lipid) levels are recommended every five years. Colorectal Cancer screening tests help to find pre-cancerous polyps (growths in the colon) so they can be removed before they turn into cancer. Tests ordered for screening depend on your personal and family history risk factors. Prostate Cancer Screening (annually up to age 76) Screening for breast cancer is recommended yearly with a Mammogram. 
 
Screening for cervical and vaginal cancer is recommended with a pelvic and Pap test every two years. However if you have had an abnormal pap in the past  three years or at high risk for cervical or vaginal cancer Medicare will cover a pap test and a pelvic exam every year. Here is a list of your current Health Maintenance items with a due date: 
Health Maintenance Due Topic Date Due  
 Flu Vaccine  08/01/2019 Chan Annual Well Visit  12/07/2019

## 2019-12-09 NOTE — PROGRESS NOTES
This note will not be viewable in [address] Syed Contreras is a 71 y.o. male and presents with Annual Wellness Visit and Follow Up Chronic Condition  . Subjective:  Mr. Janina Hernadez presents to the office today for Medicare wellness check and follow-up of a number of medical issues    The patient has hypertension and he remains on lisinopril. He is tolerating this without cough, orthostatic dizziness or lower extremity edema. He denies any headaches, numbness, tingling or focal neurological problems. The patient has a history of a prostate nodule which is been evaluated by urology. PSAs have been normal and he was discharged by the urologist for follow-up with us. The patient does have a history of colon polyps and did have a colonoscopy this past year which time his rectal examination was completely normal.  He denies any urinary flow problems. Past Medical History:   Diagnosis Date    Acute bacterial prostatitis 11/6/2017    Hypertension 11/6/2017    Impacted cerumen of both ears 11/6/2017    Knee pain 11/6/2017    Prostate nodule 11/6/2017    Tick bite 11/6/2017     Past Surgical History:   Procedure Laterality Date    COLONOSCOPY N/A 11/15/2019    COLONOSCOPY performed by Belma Mcardle, MD at [facility] ENDOSCOPY     Allergies   Allergen Reactions    Ciprofloxacin Other (comments)     Joint pain     Current Outpatient Medications   Medication Sig Dispense Refill    lisinopril (PRINIVIL, ZESTRIL) 10 mg tablet TAKE 1/2 TABLET BY MOUTH EVERY DAY 60 Tab 0    naproxen (NAPROSYN) 500 mg tablet TAKE 1 TABLET BY MOUTH TWICE DAILY WITH FOOD 60 Tab 0    DOCOSAHEXANOIC ACID/EPA (FISH OIL PO) Take  by mouth.  multivitamin (ONE A DAY) tablet Take 1 Tab by mouth daily.        Social History     Socioeconomic History    Marital status:      Spouse name: Not on file    Number of children: Not on file    Years of education: Not on file    Highest education level: Not on file   Tobacco Use    Smoking status: Former Smoker    Smokeless tobacco: Never Used   Substance and Sexual Activity    Alcohol use: Yes     Alcohol/week: 1.0 standard drinks     Types: 1 Cans of beer per week    Drug use: No     Family History   Problem Relation Age of Onset    Cancer Mother         BREAST    No Known Problems Father        Health Maintenance   Topic Date Due    GLAUCOMA SCREENING Q2Y  07/12/2020    MEDICARE YEARLY EXAM  12/09/2020    DTaP/Tdap/Td series (2 - Td) 02/15/2025    COLONOSCOPY  11/15/2029    Hepatitis C Screening  Completed    Shingrix Vaccine Age 50>  Completed    Influenza Age 5 to Adult  Completed    Pneumococcal 65+ years  Completed        Review of Systems  Constitutional: negative for fevers, chills, anorexia and weight loss  Eyes:   negative for visual disturbance and irritation  ENT:   negative for tinnitus,sore throat,nasal congestion,ear pain,hoarseness  Respiratory:  negative for cough, hemoptysis, dyspnea,wheezing  CV:   negative for chest pain, palpitations, lower extremity edema  GI:   negative for nausea, vomiting, diarrhea, abdominal pain,melena  Endo:               negative for polyuria,polydipsia,polyphagia,heat intolerance  Genitourinary: negative for frequency, dysuria and hematuria  Integumentary: negative for rash and pruritus  Hematologic:  negative for easy bruising and gum/nose bleeding  Musculoskel: negative for myalgias, arthralgias, back pain, muscle weakness, joint pain  Neurological:  negative for headaches, dizziness, vertigo, memory problems and gait   Behavl/Psych: negative for feelings of anxiety, depression, mood changes  ROS otherwise negative      Objective:  Visit Vitals  /80 (BP 1 Location: Right arm, BP Patient Position: Sitting)   Pulse 65   Temp 98.1 °F (36.7 °C) (Oral)   Resp 14   Ht 5' 11\" (1.803 m)   Wt 189 lb (85.7 kg)   SpO2 98%   BMI 26.36 kg/m²     Body mass index is 26.36 kg/m².     Physical Exam:   General appearance - alert, well appearing, and in no distress  Mental status - alert, oriented to person, place, and time  EYE-ANTON, EOMI,conjunctiva normal bilaterally, lids normal  ENT-ENT exam normal, no neck nodes or sinus tenderness  Nose - normal and patent, no erythema,  Or discharge   Mouth - mucous membranes moist, pharynx normal without lesions  Neck - supple, no significant adenopathy or bruit  Chest - clear to auscultation, no wheezes, rales or rhonchi. Heart - normal rate, regular rhythm, normal S1, S2, no murmurs, rubs, clicks or gallops   Abdomen - soft, nontender, nondistended, no masses or organomegaly  Lymph- no adenopathy palpable  Ext-peripheral pulses normal, no pedal edema, no clubbing or cyanosis  Skin-Warm and dry. no hyperpigmentation, vitiligo, or suspicious lesions  Neuro -alert, oriented, normal speech, no focal findings or movement disorder noted    In addition this patient is seen for AWV  as detailed below: This is a Subsequent Medicare Annual Wellness Exam (AWV) (Performed 12 months after IPPE or effective date of Medicare Part B enrollment)    I have reviewed the patient's medical history in detail and updated the computerized patient record. Problem list reviewed with patient and risk factors discussed. PSH, SH, FH, Medications and HM issues also reviewed and discussed. Depression screen, fall risk assessment, functional abilities and ACP also reviewed and discussed as above and below. Depression Risk Factor Screening:     3 most recent PHQ Screens 12/9/2019   Little interest or pleasure in doing things Not at all   Feeling down, depressed, irritable, or hopeless Not at all   Total Score PHQ 2 0     Alcohol Risk Factor Screening: You do not drink alcohol or very rarely. Functional Ability and Level of Safety:   Hearing Loss  Hearing is good. Activities of Daily Living  The home contains: no safety equipment.   Patient does total self care    Fall Risk  Fall Risk Assessment, last 12 mths 12/9/2019   Able to walk? Yes   Fall in past 12 months? No       Abuse Screen  Patient is not abused    Cognitive Screening   Evaluation of Cognitive Function:  Has your family/caregiver stated any concerns about your memory: no  Normal    Patient Care Team   Patient Care Team:  Monster Carrasco MD as PCP - General (Internal Medicine)  Monster Carrasco MD as PCP - [facility] Provider  Samir Delarosa MD (Dermatology)  Hanh Alexandra MD (Ophthalmology)    Assessment/Plan   Education and counseling provided:  Are appropriate based on today's review and evaluation    Assessment/Plan:   Impressions:      ICD-10-CM ICD-9-CM    1. Medicare annual wellness visit, subsequent Z00.00 V70.0    2. Essential hypertension I10 401.9 CBC WITH AUTOMATED DIFF      LIPID PANEL      METABOLIC PANEL, COMPREHENSIVE      COLLECTION VENOUS BLOOD,VENIPUNCTURE      URINALYSIS W/MICROSCOPIC   3. Prostate nodule N40.2 600.10 PSA, DIAGNOSTIC (PROSTATE SPECIFIC AG)   4. Polyp of colon, unspecified part of colon, unspecified type K63.5 211.3         Plan:  1. Continue present meds  2. Lifestyle modifications including Na restriction, low carb/fat diet, weight reduction and exercise (at least a walking program). Follow-up and Dispositions    · Return in about 6 months (around 6/9/2020).            Orders Placed This Encounter    CBC WITH AUTOMATED DIFF    LIPID PANEL ([facility])    METABOLIC PANEL, COMPREHENSIVE ([facility])    URINALYSIS W/MICROSCOPIC ([facility])    PROSTATE SPECIFIC AG (Margaret Shayne)    COLLECTION VENOUS BLOOD,VENIPUNCTURE       Amirah Tidwell MD   Assessment/Plan:  Diagnoses and all orders for this visit:    Medicare annual wellness visit, subsequent    Essential hypertension  -     CBC WITH AUTOMATED DIFF  -     LIPID PANEL  -     METABOLIC PANEL, COMPREHENSIVE  -     COLLECTION VENOUS BLOOD,VENIPUNCTURE  -     URINALYSIS W/MICROSCOPIC    Prostate nodule  - PSA, DIAGNOSTIC (PROSTATE SPECIFIC AG)    Polyp of colon, unspecified part of colon, unspecified type        There are no preventive care reminders to display for this patient. Other instructions: The patient's medications were reviewed and reconciled. No change in his current medical regimen is made. Body mass index is 26.36 and dietary counseling along with printed patient education is given    Health maintenance issues were reviewed and are up-to-date    Await results of multiple labs    Follow-up 6 months    Follow-up and Dispositions    · Return in about 6 months (around 6/9/2020). I have reviewed with the patient details of the assessment and plan and all questions were answered. Relevent patient education was performed. The most recent lab findings were reviewed with the patient. An After Visit Summary was printed and given to the patient.     Elena Dash MD

## 2019-12-09 NOTE — PROGRESS NOTES
Chief Complaint   Patient presents with    Annual Wellness Visit    Follow Up Chronic Condition       Depression Risk Factor Screening:     3 most recent PHQ Screens 12/9/2019   Little interest or pleasure in doing things Not at all   Feeling down, depressed, irritable, or hopeless Not at all   Total Score PHQ 2 0       Functional Ability and Level of Safety:     Activities of Daily Living  ADL Assessment 12/9/2019   Feeding yourself No Help Needed   Getting from bed to chair No Help Needed   Getting dressed No Help Needed   Bathing or showering No Help Needed   Walk across the room (includes cane/walker) No Help Needed   Using the telphone No Help Needed   Taking your medications No Help Needed   Preparing meals No Help Needed   Managing money (expenses/bills) No Help Needed   Moderately strenuous housework (laundry) No Help Needed   Shopping for personal items (toiletries/medicines) No Help Needed   Shopping for groceries No Help Needed   Driving No Help Needed   Climbing a flight of stairs No Help Needed   Getting to places beyond walking distances No Help Needed       Fall Risk  Fall Risk Assessment, last 12 mths 12/9/2019   Able to walk? Yes   Fall in past 12 months? No       Abuse Screen  Abuse Screening Questionnaire 12/9/2019   Do you ever feel afraid of your partner? N   Are you in a relationship with someone who physically or mentally threatens you? N   Is it safe for you to go home?  Y         Patient Care Team   Patient Care Team:  Souleymane Moore MD as PCP - General (Internal Medicine)  Souleymane Moore MD as PCP - REHABILITATION [facility] Empaneled Provider  Phan Cervantes MD (Dermatology)  Lita Freed MD (Ophthalmology)

## 2019-12-10 LAB
BASOPHILS # BLD AUTO: 0.1 X10E3/UL (ref 0–0.2)
BASOPHILS NFR BLD AUTO: 1 %
EOSINOPHIL # BLD AUTO: 0.1 X10E3/UL (ref 0–0.4)
EOSINOPHIL NFR BLD AUTO: 1 %
ERYTHROCYTE [DISTWIDTH] IN BLOOD BY AUTOMATED COUNT: 12.2 % (ref 12.3–15.4)
HCT VFR BLD AUTO: 49.4 % (ref 37.5–51)
HGB BLD-MCNC: 16.6 G/DL (ref 13–17.7)
IMM GRANULOCYTES # BLD AUTO: 0 X10E3/UL (ref 0–0.1)
IMM GRANULOCYTES NFR BLD AUTO: 0 %
LYMPHOCYTES # BLD AUTO: 1.6 X10E3/UL (ref 0.7–3.1)
LYMPHOCYTES NFR BLD AUTO: 21 %
MCH RBC QN AUTO: 30.9 PG (ref 26.6–33)
MCHC RBC AUTO-ENTMCNC: 33.6 G/DL (ref 31.5–35.7)
MCV RBC AUTO: 92 FL (ref 79–97)
MONOCYTES # BLD AUTO: 0.5 X10E3/UL (ref 0.1–0.9)
MONOCYTES NFR BLD AUTO: 7 %
NEUTROPHILS # BLD AUTO: 5.4 X10E3/UL (ref 1.4–7)
NEUTROPHILS NFR BLD AUTO: 70 %
PLATELET # BLD AUTO: 317 X10E3/UL (ref 150–450)
RBC # BLD AUTO: 5.37 X10E6/UL (ref 4.14–5.8)
WBC # BLD AUTO: 7.7 X10E3/UL (ref 3.4–10.8)

## 2019-12-12 ENCOUNTER — TELEPHONE (OUTPATIENT)
Dept: INTERNAL MEDICINE CLINIC | Age: 69
End: 2019-12-12

## 2019-12-12 LAB — BACTERIA UR CULT: ABNORMAL

## 2019-12-12 RX ORDER — AMOXICILLIN AND CLAVULANATE POTASSIUM 875; 125 MG/1; MG/1
1 TABLET, FILM COATED ORAL 2 TIMES DAILY
Qty: 28 TAB | Refills: 0 | Status: SHIPPED | OUTPATIENT
Start: 2019-12-12 | End: 2019-12-26

## 2019-12-13 ENCOUNTER — PATIENT MESSAGE (OUTPATIENT)
Dept: INTERNAL MEDICINE CLINIC | Age: 69
End: 2019-12-13

## 2019-12-13 RX ORDER — LISINOPRIL 5 MG/1
5 TABLET ORAL DAILY
Qty: 90 TAB | Refills: 2 | Status: SHIPPED | OUTPATIENT
Start: 2019-12-13 | End: 2021-01-11

## 2019-12-13 NOTE — TELEPHONE ENCOUNTER
Last Refill: 11/15/2019  Last visit:12/9/2019    NOV: Visit date not found    Requested Prescriptions     Pending Prescriptions Disp Refills    lisinopril (PRINIVIL, ZESTRIL) 5 mg tablet 90 Tab 2     Sig: Take 1 Tab by mouth daily.

## 2019-12-26 ENCOUNTER — TELEPHONE (OUTPATIENT)
Dept: INTERNAL MEDICINE CLINIC | Age: 69
End: 2019-12-26

## 2019-12-26 NOTE — TELEPHONE ENCOUNTER
CHAD  Spoke to patient- he went to the [facility] Drs) [phone] for a GI issue. He said they checked his urine and it was clear. He also said he would like to wait 3 months to check his PSA.

## 2019-12-26 NOTE — TELEPHONE ENCOUNTER
Mr Saman Allen has a reaction to the antibiotic for the UTI and wants to come in at the earliest date for a consutation about it  He has stopped the antibiotic   Wants to know what to do next  Please call and advise

## 2020-01-24 RX ORDER — SUCRALFATE 1 G/1
1 TABLET ORAL 4 TIMES DAILY
COMMUNITY
End: 2021-01-11 | Stop reason: ALTCHOICE

## 2020-01-24 RX ORDER — PANTOPRAZOLE SODIUM 20 MG/1
20 TABLET, DELAYED RELEASE ORAL DAILY
COMMUNITY
End: 2020-02-11 | Stop reason: ALTCHOICE

## 2020-01-24 NOTE — PERIOP NOTES
[facility]  Ambulatory Surgery Unit  Pre-operative Instructions for Endo Procedures    Procedure Date  01/30            Tentative Arrival Time 0715      1. On the day of your procedure, please report to the Ambulatory Surgery Unit Registration Desk and sign in at your designated time. The Ambulatory Surgery Unit is located in [facility] on the [facility] side of the [facility] across from the [address] [place] [address]. Please have all of your health insurance cards and a photo ID. 2. You must have someone with you to drive you home, as you should not drive a car for 24 hours following anesthesia. Please make arrangements for a responsible adult friend or family member to stay with you for at least the first 24 hours after your procedure. 3. Do not have anything to eat or drink (including water, gum, mints, coffee, juice) after 11:59 PM 01/29. This may not apply to medications prescribed by your physician. (Please note below the special instructions with medications to take the morning of your procedure.)    4. If applicable, follow the clear liquid diet and bowel prep instructions provided by your physician's office. If you do not have this information, or have any questions, please contact your physician's office. 5. We recommend you do not drink any alcoholic beverages for 24 hours before and after your procedure. 6. Contact your surgeons office for instructions on the following medications: non-steroidal anti-inflammatory drugs (i.e. Advil, Aleve), vitamins, and supplements. (Some surgeons will want you to stop these medications prior to surgery and others may allow you to take them)   **If you are currently taking Plavix, Coumadin, Aspirin and/or other blood-thinning agents, contact your surgeon for instructions. ** Your surgeon will partner with the physician prescribing these medications to determine if it is safe to stop or if you need to continue taking.  Please do not stop taking these medications without instructions from your surgeon. 7. In an effort to help prevent surgical site infection, we ask that you shower with an anti-bacterial soap (i.e. Dial or Safeguard) on the morning of your procedure. Do not apply any lotions, powders, or deodorants after showering. 8. Wear comfortable clothes. Wear glasses instead of contacts. Do not bring any jewelry or money (other than copays or fees as instructed). Do not wear make-up, particularly mascara, the morning of your procedure. Wear your hair loose or down, no ponytails, buns, marisela pins or clips. All body piercings must be removed. 9. You should understand that if you do not follow these instructions your procedure may be cancelled. If your physical condition changes (i.e. fever, cold or flu) please contact your surgeon as soon as possible. 10. It is important that you be on time. If a situation occurs where you may be late, or if you have any questions or problems, please call [phone]. 11. Your procedure time may be subject to change. You will receive a phone call the day prior to confirm your arrival time. Special Instructions: Take all medications and inhalers, as prescribed, on the morning of surgery with a sip of water EXCEPT: n/a    I understand a pre-operative phone call will be made to verify my procedure time. In the event that I am not available, I give permission for a message to be left on my answering service and/or with another person?       yes         ___________________      ___________________      ___________________  (Signature of Patient)          (Witness)                   (Date and Time)

## 2020-01-29 ENCOUNTER — ANESTHESIA EVENT (OUTPATIENT)
Dept: SURGERY | Age: 70
End: 2020-01-29
Payer: MEDICARE

## 2020-01-30 ENCOUNTER — ANESTHESIA (OUTPATIENT)
Dept: SURGERY | Age: 70
End: 2020-01-30
Payer: MEDICARE

## 2020-01-30 ENCOUNTER — HOSPITAL ENCOUNTER (OUTPATIENT)
Age: 70
Setting detail: OUTPATIENT SURGERY
Discharge: HOME | End: 2020-01-30
Attending: SPECIALIST | Admitting: SPECIALIST
Payer: MEDICARE

## 2020-01-30 VITALS
HEART RATE: 60 BPM | WEIGHT: 180 LBS | TEMPERATURE: 97.6 F | HEIGHT: 71 IN | SYSTOLIC BLOOD PRESSURE: 127 MMHG | BODY MASS INDEX: 25.2 KG/M2 | RESPIRATION RATE: 18 BRPM | DIASTOLIC BLOOD PRESSURE: 88 MMHG | OXYGEN SATURATION: 98 %

## 2020-01-30 LAB
H PYLORI FROM TISSUE: NEGATIVE
KIT LOT NO., HCLOLOT: NORMAL
NEGATIVE CONTROL: NEGATIVE
POSITIVE CONTROL: POSITIVE

## 2020-01-30 PROCEDURE — 77030020255 HC SOL INJ LR 1000ML BG: Performed by: SPECIALIST

## 2020-01-30 PROCEDURE — 74011250636 HC RX REV CODE- 250/636: Performed by: NURSE ANESTHETIST, CERTIFIED REGISTERED

## 2020-01-30 PROCEDURE — 88305 TISSUE EXAM BY PATHOLOGIST: CPT

## 2020-01-30 PROCEDURE — 77030021352 HC CBL LD SYS DISP COVD -B: Performed by: SPECIALIST

## 2020-01-30 PROCEDURE — 76210000046 HC AMBSU PH II REC FIRST 0.5 HR: Performed by: SPECIALIST

## 2020-01-30 PROCEDURE — 76210000034 HC AMBSU PH I REC 0.5 TO 1 HR: Performed by: SPECIALIST

## 2020-01-30 PROCEDURE — 87077 CULTURE AEROBIC IDENTIFY: CPT | Performed by: SPECIALIST

## 2020-01-30 PROCEDURE — 77030019988 HC FCPS ENDOSC DISP BSC -B: Performed by: SPECIALIST

## 2020-01-30 PROCEDURE — 76060000073 HC AMB SURG ANES FIRST 0.5 HR: Performed by: SPECIALIST

## 2020-01-30 PROCEDURE — 76030000002 HC AMB SURG OR TIME FIRST 0.: Performed by: SPECIALIST

## 2020-01-30 PROCEDURE — 74011250636 HC RX REV CODE- 250/636: Performed by: ANESTHESIOLOGY

## 2020-01-30 PROCEDURE — 74011000250 HC RX REV CODE- 250: Performed by: NURSE ANESTHETIST, CERTIFIED REGISTERED

## 2020-01-30 RX ORDER — PROPOFOL 10 MG/ML
INJECTION, EMULSION INTRAVENOUS AS NEEDED
Status: DISCONTINUED | OUTPATIENT
Start: 2020-01-30 | End: 2020-01-30 | Stop reason: HOSPADM

## 2020-01-30 RX ORDER — FENTANYL CITRATE 50 UG/ML
25 INJECTION, SOLUTION INTRAMUSCULAR; INTRAVENOUS
Status: DISCONTINUED | OUTPATIENT
Start: 2020-01-30 | End: 2020-01-30 | Stop reason: HOSPADM

## 2020-01-30 RX ORDER — SODIUM CHLORIDE 0.9 % (FLUSH) 0.9 %
5-40 SYRINGE (ML) INJECTION AS NEEDED
Status: DISCONTINUED | OUTPATIENT
Start: 2020-01-30 | End: 2020-01-30 | Stop reason: HOSPADM

## 2020-01-30 RX ORDER — SODIUM CHLORIDE 0.9 % (FLUSH) 0.9 %
5-40 SYRINGE (ML) INJECTION EVERY 8 HOURS
Status: DISCONTINUED | OUTPATIENT
Start: 2020-01-30 | End: 2020-01-30 | Stop reason: HOSPADM

## 2020-01-30 RX ORDER — LIDOCAINE HYDROCHLORIDE 10 MG/ML
0.1 INJECTION, SOLUTION EPIDURAL; INFILTRATION; INTRACAUDAL; PERINEURAL AS NEEDED
Status: DISCONTINUED | OUTPATIENT
Start: 2020-01-30 | End: 2020-01-30 | Stop reason: HOSPADM

## 2020-01-30 RX ORDER — DEXTROMETHORPHAN/PSEUDOEPHED 2.5-7.5/.8
1.2 DROPS ORAL
Status: DISCONTINUED | OUTPATIENT
Start: 2020-01-30 | End: 2020-01-30 | Stop reason: HOSPADM

## 2020-01-30 RX ORDER — SODIUM CHLORIDE, SODIUM LACTATE, POTASSIUM CHLORIDE, CALCIUM CHLORIDE 600; 310; 30; 20 MG/100ML; MG/100ML; MG/100ML; MG/100ML
25 INJECTION, SOLUTION INTRAVENOUS CONTINUOUS
Status: DISCONTINUED | OUTPATIENT
Start: 2020-01-30 | End: 2020-01-30 | Stop reason: HOSPADM

## 2020-01-30 RX ORDER — SODIUM CHLORIDE 9 MG/ML
50 INJECTION, SOLUTION INTRAVENOUS CONTINUOUS
Status: DISCONTINUED | OUTPATIENT
Start: 2020-01-30 | End: 2020-01-30 | Stop reason: HOSPADM

## 2020-01-30 RX ORDER — LIDOCAINE HYDROCHLORIDE 20 MG/ML
INJECTION, SOLUTION EPIDURAL; INFILTRATION; INTRACAUDAL; PERINEURAL AS NEEDED
Status: DISCONTINUED | OUTPATIENT
Start: 2020-01-30 | End: 2020-01-30 | Stop reason: HOSPADM

## 2020-01-30 RX ORDER — DIPHENHYDRAMINE HYDROCHLORIDE 50 MG/ML
12.5 INJECTION, SOLUTION INTRAMUSCULAR; INTRAVENOUS AS NEEDED
Status: DISCONTINUED | OUTPATIENT
Start: 2020-01-30 | End: 2020-01-30 | Stop reason: HOSPADM

## 2020-01-30 RX ORDER — ONDANSETRON 2 MG/ML
4 INJECTION INTRAMUSCULAR; INTRAVENOUS AS NEEDED
Status: DISCONTINUED | OUTPATIENT
Start: 2020-01-30 | End: 2020-01-30 | Stop reason: HOSPADM

## 2020-01-30 RX ADMIN — LIDOCAINE HYDROCHLORIDE 100 MG: 20 INJECTION, SOLUTION INTRAVENOUS at 08:46

## 2020-01-30 RX ADMIN — PROPOFOL 100 MG: 10 INJECTION, EMULSION INTRAVENOUS at 08:46

## 2020-01-30 RX ADMIN — PROPOFOL 20 MG: 10 INJECTION, EMULSION INTRAVENOUS at 08:48

## 2020-01-30 RX ADMIN — PROPOFOL 20 MG: 10 INJECTION, EMULSION INTRAVENOUS at 08:51

## 2020-01-30 RX ADMIN — SODIUM CHLORIDE, SODIUM LACTATE, POTASSIUM CHLORIDE, AND CALCIUM CHLORIDE 25 ML/HR: 600; 310; 30; 20 INJECTION, SOLUTION INTRAVENOUS at 08:07

## 2020-01-30 RX ADMIN — PROPOFOL 20 MG: 10 INJECTION, EMULSION INTRAVENOUS at 08:56

## 2020-01-30 RX ADMIN — PROPOFOL 20 MG: 10 INJECTION, EMULSION INTRAVENOUS at 08:53

## 2020-01-30 NOTE — PERIOP NOTES
5720: Patient received to PACU, VSS. Patient anesthetized. 0056: Patient's wife at bedside. 9221: Patient drowsy but arouses to voice. 0930: Patient awake and alert tolerating liquids. Discharge instructions given. Patient and wife verbalize understanding of instructions and follow up appointment. Patient and wife state ready for discharge. IV removed. Patient discharged at this time by wheelchair with belongings, wife to provide transportation home.

## 2020-01-30 NOTE — PERIOP NOTES
Permission received to review discharge instructions and discuss private health information with Linda Riosallenalix, wife.

## 2020-01-30 NOTE — PROCEDURES
Esophagogastroduodenoscopy Procedure Note      Ana Luisa Schmidt  1950  [id number]    Indication:  Epigastric pain, abnormal CT duodenum     Endoscopist: Neema Hooper MD    Referring Provider:  Ab Joyce MD    Sedation:  MAC anesthesia Propofol    Procedure Details:  After infomed consent was obtained for the procedure, with all risks and benefits of procedure explained the patient was taken to the endoscopy suite and placed in the left lateral decubitus position. Following sequential administration of sedation as per above, the endoscope was inserted into the mouth and advanced under direct vision to second portion of the duodenum. A careful inspection was made as the gastroscope was withdrawn, including a retroflexed view of the proximal stomach; findings and interventions are described below. Findings:     Esophagus:   + There was an irregular z line located at 38 cm from the incisors s/p bx to r/o Ewing's. Remaining mucosa was normal.    Stomach:   + Mild diffuse erythema without erosions c/w gastritis s/p ARELI and Path bx. Duodenum:   - Mild erythema in the duodenal bulb but no erosions/ulcers. Scope advanced to the third portion. S/P Random duodenal bxs. Therapies:  As above    Specimen: Specimens were collected as described and send to the laboratory. Complications:   None were encountered during the procedure. EBL:  < 10 ml.           Recommendations:   -f/u path  -continue acid suppression  -avoid nsaids  Neema Hooper MD  1/30/2020  9:03 AM

## 2020-01-30 NOTE — ANESTHESIA POSTPROCEDURE EVALUATION
Procedure(s):  ESOPHAGOGASTRODUODENOSCOPY (EGD)  ESOPHAGOGASTRODUODENAL (EGD) BIOPSY.     total IV anesthesia, general    Anesthesia Post Evaluation      Multimodal analgesia: multimodal analgesia not used between 6 hours prior to anesthesia start to PACU discharge  Patient location during evaluation: PACU  Patient participation: complete - patient participated  Level of consciousness: awake and alert  Pain score: 0  Airway patency: patent  Anesthetic complications: no  Cardiovascular status: acceptable  Respiratory status: acceptable  Hydration status: acceptable  Post anesthesia nausea and vomiting:  none      Vitals Value Taken Time   /88 1/30/2020  9:25 AM   Temp 36.4 °C (97.6 °F) 1/30/2020  9:25 AM   Pulse 60 1/30/2020  9:25 AM   Resp 18 1/30/2020  9:25 AM   SpO2 98 % 1/30/2020  9:25 AM

## 2020-01-30 NOTE — DISCHARGE INSTRUCTIONS
Garrett Tamayo  [id number]  1950    EGD DISCHARGE INSTRUCTIONS  Discomfort:  Sore throat- throat lozenges or warm salt water gargle  redness at IV site- apply warm compress to area; if redness or soreness persist- contact your physician  Gaseous discomfort- walking, belching will help relieve any discomfort  You may not operate a vehicle for 12 hours  You may not engage in an occupation involving machinery or appliances for rest of today. You may not drink alcoholic beverages for at least 12 hours  Avoid making any critical decisions for at least 24 hour  DIET  You may resume your regular diet - however -  remember your colon is empty and a heavy meal will produce gas. Avoid these foods:  vegetables, fried / greasy foods, carbonated drinks  MEDICATIONS        Regarding Aspirin or Nonsteroidal medications specifically, please see below. ACTIVITY  You may resume your normal daily activities. Spend the remainder of the day resting -  avoid any strenuous activity. CALL M.D. ANY SIGN OF   Increasing pain, nausea, vomiting  Abdominal distension (swelling)  New increased bleeding (oral or rectal)  Fever (chills)  Pain in chest area  Bloody discharge from nose or mouth  Shortness of breath    You may not not take any Advil, Aspirin, Ibuprofen, Motrin, Aleve, or Goodys for 10 days, ONLY  Tylenol as needed for pain. Follow-up Instructions:   Call Dr. Reece Marinelli for results of procedure / biopsy in 4-5 days at telephone #  [phone]              Continue medications for acid reflux. FINDINGS: Gastritis         DO NOT TAKE SLEEPING MEDICATIONS OR ANTIANXIETY MEDICATIONS WHILE TAKING NARCOTIC PAIN MEDICATIONS,  ESPECIALLY THE NIGHT OF ANESTHESIA. CPAP PATIENTS BE SURE TO WEAR MACHINE WHENEVER NAPPING OR SLEEPING.     DISCHARGE SUMMARY from Nurse    The following personal items collected during your admission are returned to you:   Dental Appliance: Dental Appliances: None  Vision: Visual Aid: Glasses(PACU)  Hearing Aid:    Jewelry:    Clothing:    Other Valuables:    Valuables sent to safe:        PATIENT INSTRUCTIONS:    Anesthesia Discharge Instructions for Procedural Area requiring Sedation (MAC Anesthesia, Cath Lab, Endo and Radiology): You have been given medications during your procedure that may affect your memory and mental judgement for the next 24 hours. During this time frame for your safety, please follow the instructions listed below :    Have a responsible adult to drive you home and be with you for at least 12 hours.  Rest today and resume normal activities tomorrow.  Start with a soft bland diet and advance as tolerated to your recommended diet.  Do not drive any motor vehicle or operate mechanical or electrical equipment prior to [employer].  Avoid making critical decisions or signing legal documents prior to 6am tomorrow.  Do not drink alcohol prior to 6am tomorrow.  If you have sleep apnea and you plan to go home and take a nap, please use your CPAP machine not only at bedtime, but also while napping for 24 hours.  If you notice any redness or swelling on parts of your body where IV medications were given, place a warm wet washcloth over the area for 20 minutes at a time until the redness or swelling goes away. If you still have redness or swelling after 2-3 days, please call us. · You will receive a Post Operative Call from one of the Recovery Room Nurses on the day after your surgery to check on you. It is very important for us to know how you are recovering after your surgery. If you have an issue or need to speak with someone, please call your surgeon, do not wait for the post operative call. · You may receive an e-mail or letter in the mail from Juan Manuel regarding your experience with us in the Ambulatory Surgery Unit. Your feedback is valuable to us and we appreciate your participation in the survey.        · If the above instructions are not adequate, please contact WILLIAM Montez, RN Perianesthesia Manager at [phone]. If you are having problems after your surgery, call the physician at his office number. · We wish you a speedy recovery ? What to do at Home:      *  Please give a list of your current medications to your Primary Care Provider. *  Please update this list whenever your medications are discontinued, doses are      changed, or new medications (including over-the-counter products) are added. *  Please carry medication information at all times in case of emergency situations. If you have not received your influenza and/or pneumococcal vaccine, please follow up with your primary care physician. The discharge information has been reviewed with the patient and caregiver. The patient and caregiver verbalized understanding.

## 2020-01-30 NOTE — PERIOP NOTES
Aamir William  1950  [id number]    Situation:  Verbal report given from: MACHELLE Donaldson and Diamond Paige CRNA  Procedure: Procedure(s):  ESOPHAGOGASTRODUODENOSCOPY (EGD)    Background:    Preoperative diagnosis: ABNORMAL CT, EPIGASTRIC PAIN, FAMILY HISTORY COLON CANCER, DYSFUNCTIONAL DYSPEPSIA    Postoperative diagnosis: * No post-op diagnosis entered *    :  Dr. Sonia Sanchez    Assistant(s): Circ-1: Augusto Montgomery RN  Registered Nurse Assistant: Anjana Knox RN  Scrub Tech-1: Kilo WELCH    Specimens:   ID Type Source Tests Collected by Time Destination   1 : DUODENUM BIOPSY  Preservative Duodenum  Chana Pradhan MD 1/30/2020 0600 Pathology   2 : STOMACH BIOPSY  Preservative Stomach  Chana Pradhan MD 1/30/2020 4142 Pathology       Assessment:  Intra-procedure medications   Propofol 180 mg      Anesthesia gave intra-procedure sedation and medications, see anesthesia flow sheet     Intravenous fluids: LR@ KVO     Vital signs stable     Abdominal assessment: round and soft       Recommendation:    Permission to share finding with wife    All side rails up, bed in low position, wheels locked. Nurse at bedside.

## 2020-01-30 NOTE — ANESTHESIA PREPROCEDURE EVALUATION
Relevant Problems   No relevant active problems       Anesthetic History   No history of anesthetic complications            Review of Systems / Medical History  Patient summary reviewed, nursing notes reviewed and pertinent labs reviewed    Pulmonary  Within defined limits                 Neuro/Psych   Within defined limits           Cardiovascular    Hypertension: well controlled              Exercise tolerance: >4 METS     GI/Hepatic/Renal     GERD           Endo/Other  Within defined limits           Other Findings              Physical Exam    Airway  Mallampati: I  TM Distance: 4 - 6 cm  Neck ROM: normal range of motion   Mouth opening: Normal     Cardiovascular    Rhythm: regular  Rate: normal         Dental  No notable dental hx    Comments: Chipped upper right incisor   Pulmonary  Breath sounds clear to auscultation               Abdominal  GI exam deferred       Other Findings            Anesthetic Plan    ASA: 2  Anesthesia type: total IV anesthesia and general          Induction: Intravenous  Anesthetic plan and risks discussed with: Patient

## 2020-02-11 ENCOUNTER — OFFICE VISIT (OUTPATIENT)
Dept: INTERNAL MEDICINE CLINIC | Age: 70
End: 2020-02-11

## 2020-02-11 VITALS
RESPIRATION RATE: 16 BRPM | DIASTOLIC BLOOD PRESSURE: 70 MMHG | WEIGHT: 183.4 LBS | HEART RATE: 70 BPM | OXYGEN SATURATION: 94 % | HEIGHT: 71 IN | SYSTOLIC BLOOD PRESSURE: 118 MMHG | BODY MASS INDEX: 25.68 KG/M2 | TEMPERATURE: 98.1 F

## 2020-02-11 DIAGNOSIS — K29.90 GASTRITIS AND DUODENITIS: Primary | ICD-10-CM

## 2020-02-11 DIAGNOSIS — N40.2 PROSTATE NODULE: ICD-10-CM

## 2020-02-11 DIAGNOSIS — R97.20 ELEVATED PSA: ICD-10-CM

## 2020-02-11 RX ORDER — FAMOTIDINE 20 MG/1
20 TABLET, FILM COATED ORAL DAILY
COMMUNITY
Start: 2020-02-04

## 2020-02-11 RX ORDER — SUCRALFATE 1 G/1
1 TABLET ORAL
COMMUNITY
Start: 2019-12-24 | End: 2020-02-11 | Stop reason: SDUPTHER

## 2020-02-11 NOTE — PATIENT INSTRUCTIONS
Gastritis: Care Instructions Your Care Instructions Gastritis is a sore and upset stomach. It happens when something irritates the stomach lining. Many things can cause it. These include an infection such as the flu or something you ate or drank. Medicines or a sore on the lining of the stomach (ulcer) also can cause it. Your belly may bloat and ache. You may belch, vomit, and feel sick to your stomach. You should be able to relieve the problem by taking medicine. And it may help to change your diet. If gastritis lasts, your doctor may prescribe medicine. Follow-up care is a key part of your treatment and safety. Be sure to make and go to all appointments, and call your doctor if you are having problems. It's also a good idea to know your test results and keep a list of the medicines you take. How can you care for yourself at home? · If your doctor prescribed antibiotics, take them as directed. Do not stop taking them just because you feel better. You need to take the full course of antibiotics. · Be safe with medicines. If your doctor prescribed medicine to decrease stomach acid, take it as directed. Call your doctor if you think you are having a problem with your medicine. · Do not take any other medicine, including over-the-counter pain relievers, without talking to your doctor first. 
· If your doctor recommends over-the-counter medicine to reduce stomach acid, such as Pepcid AC, Prilosec, Tagamet HB, or Zantac 75, follow the directions on the label. · Drink plenty of fluids (enough so that your urine is light yellow or clear like water) to prevent dehydration. Choose water and other caffeine-free clear liquids. If you have kidney, heart, or liver disease and have to limit fluids, talk with your doctor before you increase the amount of fluids you drink. · Limit how much alcohol you drink. · Avoid coffee, tea, cola drinks, chocolate, and other foods with caffeine. They increase stomach acid. When should you call for help? Call 911 anytime you think you may need emergency care. For example, call if: 
  · You vomit blood or what looks like coffee grounds.  
  · You pass maroon or very bloody stools.  
 Call your doctor now or seek immediate medical care if: 
  · You start breathing fast and have not produced urine in the last 8 hours.  
  · You cannot keep fluids down.  
 Watch closely for changes in your health, and be sure to contact your doctor if: 
  · You do not get better as expected. Where can you learn more? Go to http://steven-jose luis.info/. Enter [id number] in the search box to learn more about \"Gastritis: Care Instructions. \" Current as of: November 7, 2018 Content Version: 12.2 © 0269-6868 [employer]. Care instructions adapted under license by [employer] (which disclaims liability or warranty for this information). If you have questions about a medical condition or this instruction, always ask your healthcare professional. [address] any warranty or liability for your use of this information.

## 2020-02-11 NOTE — PROGRESS NOTES
Chandrakant Shelton is a 79 y.o. male presenting for Follow Up Chronic Condition  . 1. Have you been to the ER, urgent care clinic since your last visit? Hospitalized since your last visit? 12-24-19 [facility] ER    2. Have you seen or consulted any other health care providers outside of the [address] [place] [address] since your last visit? Include any pap smears or colon screening. Dr Mert Young, last 12 mths 12/9/2019   Able to walk? Yes   Fall in past 12 months? No         Abuse Screening Questionnaire 12/9/2019   Do you ever feel afraid of your partner? N   Are you in a relationship with someone who physically or mentally threatens you? N   Is it safe for you to go home?  Y       3 most recent PHQ Screens 12/9/2019   Little interest or pleasure in doing things Not at all   Feeling down, depressed, irritable, or hopeless Not at all   Total Score PHQ 2 0       Medications Discontinued During This Encounter   Medication Reason    sucralfate (CARAFATE) 1 gram tablet Duplicate Order

## 2020-02-11 NOTE — PROGRESS NOTES
This note will not be viewable in [address] Subjective:     Mr. Silvana Guevara returns to the office today in general medical follow-up. Patient's history recently has been notable for some probable prostatitis for which he was treated with before [place] but was also associated with an elevated PSA of 17.9. The patient has subsequently had a follow-up urinalysis which was negative for infection and he is having no urinary symptoms at the present time. He does have an appointment to be seen by Dr. Afia Nation at [facility] and will have a follow-up PSA done there in the near future. Patient also recently had abdominal pain which was eventually found to be due to gastritis/duodenitis and he did go and have an upper endoscopy done by Dr. Shasta Floyd which confirmed this. The patient was placed on Carafate and his abdominal symptoms have improved. The patient did try to use some Voltaren gel for his arthritis but only had variable success with this. He did try some CBD oil and found this to be effective in controlling a lot of his pain symptoms. Overall the patient is doing well and offers no new complaints today. Past Medical History:   Diagnosis Date    Acute bacterial prostatitis 11/6/2017    Hypertension 11/6/2017    Impacted cerumen of both ears 11/6/2017    Knee pain 11/6/2017    Prostate nodule 11/6/2017    Tick bite 11/6/2017     Past Surgical History:   Procedure Laterality Date    COLONOSCOPY N/A 11/15/2019    COLONOSCOPY performed by Roopa Harrison MD at [facility] ENDOSCOPY     Allergies   Allergen Reactions    Ciprofloxacin Other (comments)     Joint pain     Current Outpatient Medications   Medication Sig Dispense Refill    famotidine (PEPCID) 20 mg tablet       sucralfate (CARAFATE) 1 gram tablet Take 1 g by mouth four (4) times daily.  lisinopril (PRINIVIL, ZESTRIL) 5 mg tablet Take 1 Tab by mouth daily. 90 Tab 2    DOCOSAHEXANOIC ACID/EPA (FISH OIL PO) Take  by mouth.       multivitamin (ONE A DAY) tablet Take 1 Tab by mouth daily. Social History     Socioeconomic History    Marital status:      Spouse name: Not on file    Number of children: Not on file    Years of education: Not on file    Highest education level: Not on file   Tobacco Use    Smoking status: Former Smoker    Smokeless tobacco: Never Used   Substance and Sexual Activity    Alcohol use: Yes     Alcohol/week: 1.0 standard drinks     Types: 1 Cans of beer per week    Drug use: No     Family History   Problem Relation Age of Onset    Cancer Mother         BREAST    No Known Problems Father        Review of Systems:  GEN: no weight loss, weight gain, fatigue or night sweats  CV: no PND, orthopnea, or palpitations  Resp: no dyspnea on exertion, no cough  Abd: no nausea, vomiting or diarrhea  EXT: Positive for arthritic pain  Endocrine: no hair loss, excessive thirst or polyuria  Neurological ROS: no TIA or stroke symptoms  ROS otherwise negative      Objective:     Visit Vitals  /70 (BP 1 Location: Right arm, BP Patient Position: Sitting)   Pulse 70   Temp 98.1 °F (36.7 °C) (Oral)   Resp 16   Ht 5' 11\" (1.803 m)   Wt 183 lb 6.4 oz (83.2 kg)   SpO2 94%   BMI 25.58 kg/m²     Body mass index is 25.58 kg/m². General:   alert, cooperative and no distress   Eyes: conjunctivae/sclerae clear. PERRL, EOM's intact   Mouth:  No oral lesions, no pharyngeal erythema, no exudates   Neck: Trachea midline, no thyromegaly, no bruits   Heart: S1 and S2 normal,no murmurs noted    Lungs: Clear to auscultation bilaterally, no increased work of breathing   Abdomen: Soft, nontender.   Normal bowel sounds   Extremities: No edema or cyanosis   Neuro: ..alert, oriented x3,speech normal in context and clarity, cranial nerves II-XII intact,motor strength: full proximally and distally,gait: normal  reflexes: full and symmetric     Physical exam otherwise negative         Assessment/Plan:     Diagnoses and all orders for this visit:    Gastritis and duodenitis    Prostate nodule    Elevated PSA        Other instructions: The patient's medications were reviewed and reconciled. Have recommended that he avoid NSAID's. Continue current PPI and Carafate management. Follow-up with urology regarding elevated PSA in the near future    Follow-up here as previously scheduled    Follow-up and Dispositions    · Return for As previously scheduled. Rachna Berrios MD    Please note that this dictation was completed with [employer], the computer voice recognition software. Quite often unanticipated grammatical, syntax, homophones, and other interpretive errors are inadvertently transcribed by the computer software. Please disregard these errors. Please excuse any errors that have escaped final proofreading.

## 2021-01-11 ENCOUNTER — OFFICE VISIT (OUTPATIENT)
Dept: INTERNAL MEDICINE CLINIC | Age: 71
End: 2021-01-11
Payer: MEDICARE

## 2021-01-11 VITALS
HEIGHT: 71 IN | BODY MASS INDEX: 26.04 KG/M2 | RESPIRATION RATE: 16 BRPM | OXYGEN SATURATION: 98 % | HEART RATE: 60 BPM | DIASTOLIC BLOOD PRESSURE: 72 MMHG | WEIGHT: 186 LBS | SYSTOLIC BLOOD PRESSURE: 120 MMHG | TEMPERATURE: 98.5 F

## 2021-01-11 DIAGNOSIS — I10 ESSENTIAL HYPERTENSION: ICD-10-CM

## 2021-01-11 DIAGNOSIS — N40.2 PROSTATE NODULE: ICD-10-CM

## 2021-01-11 DIAGNOSIS — K29.90 GASTRITIS AND DUODENITIS: ICD-10-CM

## 2021-01-11 DIAGNOSIS — R97.20 ELEVATED PSA: ICD-10-CM

## 2021-01-11 DIAGNOSIS — Z00.00 MEDICARE ANNUAL WELLNESS VISIT, SUBSEQUENT: Primary | ICD-10-CM

## 2021-01-11 DIAGNOSIS — N39.0 URINARY TRACT INFECTION WITHOUT HEMATURIA, SITE UNSPECIFIED: ICD-10-CM

## 2021-01-11 LAB
A-G RATIO,AGRAT: 1.6 RATIO
ALBUMIN SERPL-MCNC: 4.7 G/DL (ref 3.9–5.4)
ALP SERPL-CCNC: 73 U/L (ref 38–126)
ALT SERPL-CCNC: 20 U/L (ref 0–50)
ANION GAP SERPL CALC-SCNC: 10 MMOL/L
AST SERPL W P-5'-P-CCNC: 25 U/L (ref 14–36)
BACTERIA,BACTU: ABNORMAL
BILIRUB SERPL-MCNC: 0.7 MG/DL (ref 0.2–1.3)
BILIRUB UR QL: NEGATIVE
BUN SERPL-MCNC: 27 MG/DL (ref 9–20)
BUN/CREATININE RATIO,BUCR: 27 RATIO
CALCIUM SERPL-MCNC: 9.9 MG/DL (ref 8.4–10.2)
CHLORIDE SERPL-SCNC: 104 MMOL/L (ref 98–107)
CHOL/HDL RATIO,CHHD: 3 RATIO (ref 0–4)
CHOLEST SERPL-MCNC: 209 MG/DL (ref 0–200)
CLARITY: CLEAR
CO2 SERPL-SCNC: 31 MMOL/L (ref 22–32)
COLOR UR: ABNORMAL
CREAT SERPL-MCNC: 1 MG/DL (ref 0.8–1.5)
ERYTHROCYTE [DISTWIDTH] IN BLOOD BY AUTOMATED COUNT: 12.2 %
GLOBULIN,GLOB: 2.9
GLUCOSE 24H UR-MRATE: NEGATIVE G/(24.H)
GLUCOSE SERPL-MCNC: 108 MG/DL (ref 75–110)
HCT VFR BLD AUTO: 51.2 % (ref 37–51)
HDLC SERPL-MCNC: 61 MG/DL (ref 35–130)
HGB BLD-MCNC: 16.6 G/DL (ref 12–18)
HGB UR QL STRIP: NEGATIVE
KETONES UR QL STRIP.AUTO: NEGATIVE
LDL/HDL RATIO,LDHD: 2 RATIO
LDLC SERPL CALC-MCNC: 134 MG/DL (ref 0–130)
LEUKOCYTE ESTERASE: ABNORMAL
LYMPHOCYTES ABSOLUTE: 1.9 K/UL (ref 0.6–4.1)
LYMPHOCYTES NFR BLD: 31 % (ref 10–58.5)
MCH RBC QN AUTO: 31.6 PG (ref 26–32)
MCHC RBC AUTO-ENTMCNC: 32.4 G/DL (ref 30–36)
MCV RBC AUTO: 97.5 FL (ref 80–97)
MONOCYTES ABS-DIF,2141: 0.6 K/UL (ref 0–1.8)
MONOCYTES NFR BLD: 10.5 % (ref 0.1–24)
NEUTROPHILS # BLD: 58.5 % (ref 37–92)
NEUTROPHILS ABS,2156: 3.5 K/UL (ref 2–7.8)
NITRITE UR QL STRIP.AUTO: NEGATIVE
PH UR STRIP: 5 [PH] (ref 5–7)
PLATELET # BLD AUTO: 258 K/UL (ref 140–440)
POTASSIUM SERPL-SCNC: 4.6 MMOL/L (ref 3.6–5)
PROT SERPL-MCNC: 7.6 G/DL (ref 6.3–8.2)
PROT UR STRIP-MCNC: NEGATIVE MG/DL
RBC # BLD AUTO: 5.25 M/UL (ref 4.2–6.3)
RBC #/AREA URNS HPF: 0 #/HPF
SODIUM SERPL-SCNC: 145 MMOL/L (ref 137–145)
SP GR UR REFRACTOMETRY: 1.02 (ref 1–1.03)
TRIGL SERPL-MCNC: 71 MG/DL (ref 0–200)
TSH SERPL DL<=0.05 MIU/L-ACNC: 1.1 UIU/ML (ref 0.34–5.6)
UROBILINOGEN UR QL STRIP.AUTO: NEGATIVE
VLDLC SERPL CALC-MCNC: 14 MG/DL
WBC # BLD AUTO: 6 K/UL (ref 4.1–10.9)
WBC URNS QL MICRO: ABNORMAL #/HPF

## 2021-01-11 PROCEDURE — G8752 SYS BP LESS 140: HCPCS | Performed by: INTERNAL MEDICINE

## 2021-01-11 PROCEDURE — 36415 COLL VENOUS BLD VENIPUNCTURE: CPT | Performed by: INTERNAL MEDICINE

## 2021-01-11 PROCEDURE — G8536 NO DOC ELDER MAL SCRN: HCPCS | Performed by: INTERNAL MEDICINE

## 2021-01-11 PROCEDURE — 85025 COMPLETE CBC W/AUTO DIFF WBC: CPT | Performed by: INTERNAL MEDICINE

## 2021-01-11 PROCEDURE — G8417 CALC BMI ABV UP PARAM F/U: HCPCS | Performed by: INTERNAL MEDICINE

## 2021-01-11 PROCEDURE — 99214 OFFICE O/P EST MOD 30 MIN: CPT | Performed by: INTERNAL MEDICINE

## 2021-01-11 PROCEDURE — 81001 URINALYSIS AUTO W/SCOPE: CPT | Performed by: INTERNAL MEDICINE

## 2021-01-11 PROCEDURE — G8510 SCR DEP NEG, NO PLAN REQD: HCPCS | Performed by: INTERNAL MEDICINE

## 2021-01-11 PROCEDURE — 3017F COLORECTAL CA SCREEN DOC REV: CPT | Performed by: INTERNAL MEDICINE

## 2021-01-11 PROCEDURE — 84443 ASSAY THYROID STIM HORMONE: CPT | Performed by: INTERNAL MEDICINE

## 2021-01-11 PROCEDURE — 80061 LIPID PANEL: CPT | Performed by: INTERNAL MEDICINE

## 2021-01-11 PROCEDURE — G8427 DOCREV CUR MEDS BY ELIG CLIN: HCPCS | Performed by: INTERNAL MEDICINE

## 2021-01-11 PROCEDURE — G8754 DIAS BP LESS 90: HCPCS | Performed by: INTERNAL MEDICINE

## 2021-01-11 PROCEDURE — 1101F PT FALLS ASSESS-DOCD LE1/YR: CPT | Performed by: INTERNAL MEDICINE

## 2021-01-11 PROCEDURE — G0439 PPPS, SUBSEQ VISIT: HCPCS | Performed by: INTERNAL MEDICINE

## 2021-01-11 PROCEDURE — 80053 COMPREHEN METABOLIC PANEL: CPT | Performed by: INTERNAL MEDICINE

## 2021-01-11 RX ORDER — LISINOPRIL 5 MG/1
TABLET ORAL
Qty: 90 TAB | Refills: 2 | Status: SHIPPED | OUTPATIENT
Start: 2021-01-11

## 2021-01-11 RX ORDER — TAMSULOSIN HYDROCHLORIDE 0.4 MG/1
0.4 CAPSULE ORAL DAILY
COMMUNITY

## 2021-01-11 NOTE — PATIENT INSTRUCTIONS
The best way to stay healthy is to live a healthy lifestyle. A healthy lifestyle includes regular exercise, eating a well-balanced diet, keeping a healthy weight and not smoking. Regular physical exams and screening tests are another important way to take care of yourself. Preventive exams provided by health care providers can find health problems early when treatment works best and can keep you from getting certain diseases or illnesses. Preventive services include exams, lab tests, screenings, shots, monitoring and information to help you take care of your own health. All people over 65 should have a pneumonia shot. Pneumonia shots are usually only needed once in a lifetime unless your doctor decides differently. In addition to your physical exam, some screening tests are recommended: 
 
All people over 65 should have a yearly flu shot. People over 65 are at medium to high risk for Hepatitis B. Three shots are needed for complete protection. Bone mass measurement (dexa scan) is recommended every two years. Diabetes Mellitus screening is recommended every year. Glaucoma is an eye disease caused by high pressure in the eye. An eye exam is recommended every year. Cardiovascular screening tests that check your cholesterol and other blood fat (lipid) levels are recommended every five years. Colorectal Cancer screening tests help to find pre-cancerous polyps (growths in the colon) so they can be removed before they turn into cancer. Tests ordered for screening depend on your personal and family history risk factors. Prostate Cancer Screening (annually up to age 76) Screening for breast cancer is recommended yearly with a Mammogram. 
 
 Screening for cervical and vaginal cancer is recommended with a pelvic and Pap test every two years. However if you have had an abnormal pap in the past  three years or at high risk for cervical or vaginal cancer Medicare will cover a pap test and a pelvic exam every year. Here is a list of your current Health Maintenance items with a due date: 
Health Maintenance Due Topic Date Due  Glaucoma Screening   07/12/2020  Yearly Flu Vaccine (1) 09/01/2020 [facility] Annual Well Visit  12/09/2020 DASH Diet: Care Instructions Your Care Instructions The DASH diet is an eating plan that can help lower your blood pressure. DASH stands for Dietary Approaches to Stop Hypertension. Hypertension is high blood pressure. The DASH diet focuses on eating foods that are high in calcium, potassium, and magnesium. These nutrients can lower blood pressure. The foods that are highest in these nutrients are fruits, vegetables, low-fat dairy products, nuts, seeds, and legumes. But taking calcium, potassium, and magnesium supplements instead of eating foods that are high in those nutrients does not have the same effect. The DASH diet also includes whole grains, fish, and poultry. The DASH diet is one of several lifestyle changes your doctor may recommend to lower your high blood pressure. Your doctor may also want you to decrease the amount of sodium in your diet. Lowering sodium while following the DASH diet can lower blood pressure even further than just the DASH diet alone. Follow-up care is a key part of your treatment and safety. Be sure to make and go to all appointments, and call your doctor if you are having problems. It's also a good idea to know your test results and keep a list of the medicines you take. How can you care for yourself at home? Following the DASH diet · Eat 4 to 5 servings of fruit each day. A serving is 1 medium-sized piece of fruit, ½ cup chopped or canned fruit, 1/4 cup dried fruit, or 4 ounces (½ cup) of fruit juice. Choose fruit more often than fruit juice. · Eat 4 to 5 servings of vegetables each day. A serving is 1 cup of lettuce or raw leafy vegetables, ½ cup of chopped or cooked vegetables, or 4 ounces (½ cup) of vegetable juice. Choose vegetables more often than vegetable juice. · Get 2 to 3 servings of low-fat and fat-free dairy each day. A serving is 8 ounces of milk, 1 cup of yogurt, or 1 ½ ounces of cheese. · Eat 6 to 8 servings of grains each day. A serving is 1 slice of bread, 1 ounce of dry cereal, or ½ cup of cooked rice, pasta, or cooked cereal. Try to choose whole-grain products as much as possible. · Limit lean meat, poultry, and fish to 2 servings each day. A serving is 3 ounces, about the size of a deck of cards. · Eat 4 to 5 servings of nuts, seeds, and legumes (cooked dried beans, lentils, and split peas) each week. A serving is 1/3 cup of nuts, 2 tablespoons of seeds, or ½ cup of cooked beans or peas. · Limit fats and oils to 2 to 3 servings each day. A serving is 1 teaspoon of vegetable oil or 2 tablespoons of salad dressing. · Limit sweets and added sugars to 5 servings or less a week. A serving is 1 tablespoon jelly or jam, ½ cup sorbet, or 1 cup of lemonade. · Eat less than 2,300 milligrams (mg) of sodium a day. If you limit your sodium to 1,500 mg a day, you can lower your blood pressure even more. Tips for success · Start small. Do not try to make dramatic changes to your diet all at once. You might feel that you are missing out on your favorite foods and then be more likely to not follow the plan. Make small changes, and stick with them. Once those changes become habit, add a few more changes. · Try some of the following: ? Make it a goal to eat a fruit or vegetable at every meal and at snacks. This will make it easy to get the recommended amount of fruits and vegetables each day. ? Try yogurt topped with fruit and nuts for a snack or healthy dessert. ? Add lettuce, tomato, cucumber, and onion to sandwiches. ? Combine a ready-made pizza crust with low-fat mozzarella cheese and lots of vegetable toppings. Try using tomatoes, squash, spinach, broccoli, carrots, cauliflower, and onions. ? Have a variety of cut-up vegetables with a low-fat dip as an appetizer instead of chips and dip. ? Sprinkle sunflower seeds or chopped almonds over salads. Or try adding chopped walnuts or almonds to cooked vegetables. ? Try some vegetarian meals using beans and peas. Add garbanzo or kidney beans to salads. Make burritos and tacos with mashed vizcaino beans or black beans. Where can you learn more? Go to http://www.gray.com/ Enter B874 in the search box to learn more about \"DASH Diet: Care Instructions. \" Current as of: December 16, 2019               Content Version: 12.6 © 7860-9025 [employer]. Care instructions adapted under license by [employer] (which disclaims liability or warranty for this information). If you have questions about a medical condition or this instruction, always ask your healthcare professional. [place] [address] any warranty or liability for your use of this information.

## 2021-01-11 NOTE — PROGRESS NOTES
Chief Complaint   Patient presents with    Annual Wellness Visit       Depression Risk Factor Screening:     3 most recent PHQ Screens 1/11/2021   Little interest or pleasure in doing things Not at all   Feeling down, depressed, irritable, or hopeless Not at all   Total Score PHQ 2 0       Functional Ability and Level of Safety:     Activities of Daily Living  ADL Assessment 1/11/2021   Feeding yourself No Help Needed   Getting from bed to chair No Help Needed   Getting dressed No Help Needed   Bathing or showering No Help Needed   Walk across the room (includes cane/walker) No Help Needed   Using the telphone No Help Needed   Taking your medications No Help Needed   Preparing meals No Help Needed   Managing money (expenses/bills) No Help Needed   Moderately strenuous housework (laundry) No Help Needed   Shopping for personal items (toiletries/medicines) No Help Needed   Shopping for groceries No Help Needed   Driving No Help Needed   Climbing a flight of stairs No Help Needed   Getting to places beyond walking distances No Help Needed       Fall Risk  Fall Risk Assessment, last 12 mths 1/11/2021   Able to walk? Yes   Fall in past 12 months? 0   Do you feel unsteady? 0   Are you worried about falling 0       Abuse Screen  Abuse Screening Questionnaire 1/11/2021   Do you ever feel afraid of your partner? N   Are you in a relationship with someone who physically or mentally threatens you? N   Is it safe for you to go home?  Y         Patient Care Team   Patient Care Team:  Nicolás Sue MD as PCP - General (Internal Medicine)  Nicolás Sue MD as PCP - [facility] Empaneled Provider  Jackson Gipson MD (Dermatology)  Cristina Bertrand MD (Ophthalmology)

## 2021-01-11 NOTE — PROGRESS NOTES
Roe Maravilla is a 79 y.o. male and presents with Annual Wellness Visit and Follow Up Chronic Condition  . Subjective:  Mr. Awais Donovan presents the office today for Medicare wellness check and follow-up of multiple medical problems. The patient has hypertension and remains on lisinopril. He tolerates this without cough, rash, orthostatic dizziness or lower extremity edema. Denies headaches, numbness, tingling or focal neurological problems. He has a history of gastritis and duodenitis and remains on Pepcid therapy. He denies any breakthrough heartburn or stomach pain and has had no early satiety or unexplained weight loss. He has a history of BPH with prostate nodule and elevated PSA. He has been seen and evaluated by Dr. Sj Ryan at the urology center. His follow-up appointments and PSAs have been normalized and he is currently on Flomax for his symptoms. Seems to be doing fairly well in regards to urinary flow. Past Medical History:   Diagnosis Date    Acute bacterial prostatitis 11/6/2017    Hypertension 11/6/2017    Impacted cerumen of both ears 11/6/2017    Knee pain 11/6/2017    Prostate nodule 11/6/2017    Tick bite 11/6/2017     Past Surgical History:   Procedure Laterality Date    COLONOSCOPY N/A 11/15/2019    COL[facility]COPY performed by Cyrus [facility] at [facility] ENDOSCOPY     Allergies   Allergen Reactions    Ciprofloxacin Other (comments)     Joint pain     Current Outpatient Medications   Medication Sig Dispense Refill    FRANK ROOT, BULK, by Does Not Apply route daily.  tamsulosin (FLOMAX) 0.4 mg capsule Take 0.4 mg by mouth daily.  famotidine (PEPCID) 20 mg tablet Take 20 mg by mouth daily.  lisinopril (PRINIVIL, ZESTRIL) 5 mg tablet Take 1 Tab by mouth daily. 90 Tab 2    DOCOSAHEXANOIC ACID/EPA (FISH OIL PO) Take  by mouth.  multivitamin (ONE A DAY) tablet Take 1 Tab by mouth daily.        Social History     Socioeconomic History    Marital status:      Spouse name: Not on file    Number of children: Not on file    Years of education: Not on file    Highest education level: Not on file   Tobacco Use    Smoking status: Former Smoker    Smokeless tobacco: Never Used   Substance and Sexual Activity    Alcohol use:  Yes     Alcohol/week: 1.0 standard drinks     Types: 1 Cans of beer per week    Drug use: No     Family History   Problem Relation Age of Onset    Cancer Mother         BREAST    No Known Problems Father        Health Maintenance   Topic Date Due    Medicare Yearly Exam  01/12/2022    GLAUCOMA SCREENING Q2Y  11/18/2022    Lipid Screen  12/09/2024    DTaP/Tdap/Td series (2 - Td) 02/15/2025    Colorectal Cancer Screening Combo  11/15/2029    Hepatitis C Screening  Completed    Shingrix Vaccine Age 50>  Completed    Flu Vaccine  Completed    Pneumococcal 65+ years  Completed        Review of Systems  Constitutional: negative for fevers, chills, anorexia and weight loss  Eyes:   negative for visual disturbance and irritation  ENT:   negative for tinnitus,sore throat,nasal congestion,ear pain,hoarseness  Respiratory:  negative for cough, hemoptysis, dyspnea,wheezing  CV:   negative for chest pain, palpitations, lower extremity edema  GI:   negative for nausea, vomiting, diarrhea, abdominal pain,melena  Endo:               negative for polyuria,polydipsia,polyphagia,heat intolerance  Genitourinary: negative for frequency, dysuria and hematuria  Integumentary: negative for rash and pruritus  Hematologic:  negative for easy bruising and gum/nose bleeding  Musculoskel: negative for myalgias, arthralgias, back pain, muscle weakness, joint pain  Neurological:  negative for headaches, dizziness, vertigo, memory problems and gait   Behavl/Psych: negative for feelings of anxiety, depression, mood changes  ROS otherwise negative      Objective:  Visit Vitals  /72 (BP 1 Location: Left arm, BP Patient Position: Sitting)   Pulse 60 Temp 98.5 °F (36.9 °C) (Oral)   Resp 16   Ht 5' 11\" (1.803 m)   Wt 186 lb (84.4 kg)   SpO2 98%   BMI 25.94 kg/m²     Body mass index is 25.94 kg/m². Physical Exam:   General appearance - alert, well appearing, and in no distress  Mental status - alert, oriented to person, place, and time  EYE-ANTON, EOMI,conjunctiva normal bilaterally, lids normal  ENT-ENT exam normal, no neck nodes or sinus tenderness  Nose - normal and patent, no erythema,  Or discharge   Mouth - mucous membranes moist, pharynx normal without lesions  Neck - supple, no significant adenopathy or bruit  Chest - clear to auscultation, no wheezes, rales or rhonchi. Heart - normal rate, regular rhythm, normal S1, S2, no murmurs, rubs, clicks or gallops   Abdomen - soft, nontender, nondistended, no masses or organomegaly  Lymph- no adenopathy palpable  Ext-peripheral pulses normal, no pedal edema, no clubbing or cyanosis  Skin-Warm and dry. no hyperpigmentation, vitiligo, or suspicious lesions  Neuro -alert, oriented, normal speech, no focal findings or movement disorder noted    In addition this patient is seen for AWV  as detailed below: This is a Subsequent Medicare Annual Wellness Exam (AWV) (Performed 12 months after IPPE or effective date of Medicare Part B enrollment)    I have reviewed the patient's medical history in detail and updated the computerized patient record. Problem list reviewed with patient and risk factors discussed. PSH, SH, FH, Medications and HM issues also reviewed and discussed. Depression screen, fall risk assessment, functional abilities and ACP also reviewed and discussed as above and below. Depression Risk Factor Screening:     3 most recent PHQ Screens 1/11/2021   Little interest or pleasure in doing things Not at all   Feeling down, depressed, irritable, or hopeless Not at all   Total Score PHQ 2 0     Alcohol Risk Factor Screening: You do not drink alcohol or very rarely.     Functional Ability and Level of Safety:   Hearing Loss  Hearing is good. Activities of Daily Living  The home contains: no safety equipment. Patient does total self care    Fall Risk  Fall Risk Assessment, last 12 mths 1/11/2021   Able to walk? Yes   Fall in past 12 months? 0   Do you feel unsteady? 0   Are you worried about falling 0       Abuse Screen  Patient is not abused    Cognitive Screening   Evaluation of Cognitive Function:  Has your family/caregiver stated any concerns about your memory: no  Normal    Patient Care Team   Patient Care Team:  Rosalina Whaley MD as PCP - General (Internal Medicine)  Rosalina Whaley MD as PCP - [facility]  Katie Hughes MD (Dermatology)  Austin Wagner MD (Ophthalmology)    Assessment/Plan   Education and counseling provided:  Are appropriate based on today's review and evaluation    Assessment/Plan:   Impressions:      ICD-10-CM ICD-9-CM    1. Medicare annual wellness visit, subsequent  Z00.00 V70.0    2. Essential hypertension  I10 401.9 COLLECTION VENOUS BLOOD,VENIPUNCTURE      CBC WITH AUTOMATED DIFF      LIPID PANEL      METABOLIC PANEL, COMPREHENSIVE      TSH 3RD GENERATION      URINALYSIS W/MICROSCOPIC   3. Prostate nodule  N40.2 600.10    4. Gastritis and duodenitis  K29.90 535.50    5. Elevated PSA  R97.20 790.93         Plan:  1. Continue present meds  2. Lifestyle modifications including Na restriction, low carb/fat diet, weight reduction and exercise (at least a walking program). Follow-up and Dispositions    · Return in about 1 year (around 1/11/2022).            Orders Placed This Encounter    CBC WITH AUTOMATED DIFF ([facility])    LIPID PANEL ([facility])    METABOLIC PANEL, COMPREHENSIVE ([facility])    TSH 3RD GENERATION ([facility])    URINALYSIS W/MICROSCOPIC ([facility]hem[facility])    COLLECTION VENOUS BLOOD,VENIPUNCTURE       Aysha Kingsley MD   Assessment/Plan:  Diagnoses and all orders for this visit:    Medicare annual wellness visit, subsequent    Essential hypertension  -     COLLECTION VENOUS BLOOD,VENIPUNCTURE  -     CBC WITH AUTOMATED DIFF  -     LIPID PANEL  -     METABOLIC PANEL, COMPREHENSIVE  -     TSH 3RD GENERATION  -     URINALYSIS W/MICROSCOPIC    Prostate nodule    Gastritis and duodenitis    Elevated PSA        There are no preventive care reminders to display for this patient. Other instructions: The patient's medications were reviewed and reconciled. No change in his current medical regimen will be made. A no added salt, prudent diet is encouraged along with physical activity. Health maintenance issues were reviewed and are up-to-date    Await results of multiple labs    Follow-up yearly    Follow-up and Dispositions    · Return in about 1 year (around 1/11/2022). I have reviewed with the patient details of the assessment and plan and all questions were answered. Relevent patient education was performed. The most recent lab findings were reviewed with the patient. An After Visit Summary was printed and given to the patient. Patricio Baker MD    Please note that this dictation was completed with [employer], the computer voice recognition software. Quite often unanticipated grammatical, syntax, homophones, and other interpretive errors are inadvertently transcribed by the computer software. Please disregard these errors. Please excuse any errors that have escaped final proofreading.

## 2021-01-13 LAB — BACTERIA UR CULT: NO GROWTH

## 2021-08-03 ENCOUNTER — TELEPHONE (OUTPATIENT)
Dept: INTERNAL MEDICINE CLINIC | Age: 71
End: 2021-08-03

## 2021-08-03 NOTE — TELEPHONE ENCOUNTER
Patient would like an order to have a preventative heart scan done at [facility]. He would like to  the order. Please call.      [phone]

## 2021-08-10 ENCOUNTER — TELEPHONE (OUTPATIENT)
Dept: INTERNAL MEDICINE CLINIC | Age: 71
End: 2021-08-10

## 2021-08-10 DIAGNOSIS — I35.9 CALCIFICATION OF AORTIC VALVE: Primary | ICD-10-CM

## 2021-08-10 NOTE — TELEPHONE ENCOUNTER
Patient advised of calcium scan results per Dr Maureen Ramos. Coronary artery calcium score was 0, but aortic valve was heavily calcified. Advised needs to have echocardiogram done to check for aortic stenosis. He is agreeable- please refer.

## 2022-03-18 PROBLEM — K63.5 COLON POLYPS: Status: ACTIVE | Noted: 2017-11-06

## 2022-03-18 PROBLEM — M25.569 KNEE PAIN: Status: ACTIVE | Noted: 2017-11-06

## 2022-03-19 PROBLEM — I10 HYPERTENSION: Status: ACTIVE | Noted: 2017-11-06

## 2022-03-19 PROBLEM — R97.20 ELEVATED PSA: Status: ACTIVE | Noted: 2020-02-11
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
## 2022-03-19 PROBLEM — K29.90 GASTRITIS AND DUODENITIS: Status: ACTIVE | Noted: 2020-02-11

## 2022-03-19 PROBLEM — H61.23 IMPACTED CERUMEN OF BOTH EARS: Status: ACTIVE | Noted: 2017-11-06

## 2022-03-19 PROBLEM — N41.0 ACUTE BACTERIAL PROSTATITIS: Status: ACTIVE | Noted: 2017-11-06

## 2022-03-19 PROBLEM — N40.2 PROSTATE NODULE: Status: ACTIVE | Noted: 2017-11-06

## 2022-06-28 NOTE — PROGRESS NOTES
HPI: Mary Grace Alvarado (: 1950) is a 67 y.o. male, patient, here for evaluation of the following chief complaint(s):    No chief complaint on file. The patient is seen today to evaluate his right elbow. Patient reported that he was helping care for his wife who had undergone recent surgery including hip replacement with Dr. Debbie Archibald. He was going up and down stairs inside his home several times when he missed the step and \"banged\" his right elbow. He has complained of posterior right elbow olecranon bursitis type swelling. He denied any prior history of elbow problems. He denied any numbness or tingling or any open wounds. The swelling has not fully resolved and he is seen for further treatment. Vitals:  Ht 5' (1.524 m)   Wt 184 lb (83.5 kg)   BMI 35.94 kg/m²    Body mass index is 35.94 kg/m². Allergies   Allergen Reactions    Ciprofloxacin Other (comments)     Joint pain       Current Outpatient Medications   Medication Sig    FRANK ROOT, BULK, by Does Not Apply route daily.  tamsulosin (FLOMAX) 0.4 mg capsule Take 0.4 mg by mouth daily.  lisinopriL (PRINIVIL, ZESTRIL) 5 mg tablet TAKE 1 TABLET BY MOUTH DAILY    famotidine (PEPCID) 20 mg tablet Take 20 mg by mouth daily.  DOCOSAHEXANOIC ACID/EPA (FISH OIL PO) Take  by mouth.  multivitamin (ONE A DAY) tablet Take 1 Tab by mouth daily.      Current Facility-Administered Medications   Medication    triamcinolone acetonide (KENALOG-40) 40 mg/mL injection 40 mg       Past Medical History:   Diagnosis Date    Acute bacterial prostatitis 2017    Hypertension 2017    Impacted cerumen of both ears 2017    Knee pain 2017    Prostate nodule 2017    Tick bite 2017        Past Surgical History:   Procedure Laterality Date    COLONOSCOPY N/A 11/15/2019    COLONOSCOPY performed by Zakiya Camejo MD at [facility] ENDOSCOPY       Family History   Problem Relation Age of Onset    Cancer Mother         BREAST  No Known Problems Father         Social History     Tobacco Use    Smoking status: Former Smoker    Smokeless tobacco: Never Used   Vaping Use    Vaping Use: Never used   Substance Use Topics    Alcohol use: Yes     Alcohol/week: 1.0 standard drink     Types: 1 Cans of beer per week    Drug use: No        Review of Systems   All other systems reviewed and are negative. Physical Exam    Right elbow has full range of motion but there is soft tissue swelling moderate sized olecranon bursal sac present but no warmth redness or sign of infection. Otherwise normal mobility and no bursitis on the left side. Imaging:    No new x-rays today. ASSESSMENT/PLAN:  Below is the assessment and plan developed based on review of pertinent history, physical exam, labs, studies, and medications. Patient examination is clinically consistent with right elbow olecranon bursitis. He tolerated an aspiration and corticosteroid injection on 6/29/2022. The aspirate of 10 and half cc of straw-colored fluid completely flattened the region and then a soft dressing and Ace bandage was applied after injecting corticosteroid. I will see him back in 3 weeks to check his progress. He understands the injection and even aspiration could be repeated in the future if needed. 1. Olecranon bursitis of right elbow  -     DRAIN/INJECT SMALL JOINT/BURSA  -     triamcinolone acetonide (KENALOG-40) 40 mg/mL injection 40 mg; 40 mg, Other, ONCE, 1 dose, On Wed 6/29/22 at 1700  2. Right elbow pain    Informed consent was obtained and the patient received a right elbow olecranon bursal aspirate for 10 and half cc of straw-colored clear fluid. The region was then injected with 40 mg of triamcinolone. He tolerated the aspiration injection treatment well. Return in about 4 weeks (around 7/27/2022). An electronic signature was used to authenticate this note.   -- Primo Vanegas MD

## 2022-06-29 ENCOUNTER — OFFICE VISIT (OUTPATIENT)
Dept: ORTHOPEDIC SURGERY | Age: 72
End: 2022-06-29
Payer: MEDICARE

## 2022-06-29 VITALS — BODY MASS INDEX: 36.12 KG/M2 | WEIGHT: 184 LBS | HEIGHT: 60 IN

## 2022-06-29 DIAGNOSIS — M25.521 RIGHT ELBOW PAIN: ICD-10-CM

## 2022-06-29 DIAGNOSIS — M70.21 OLECRANON BURSITIS OF RIGHT ELBOW: Primary | ICD-10-CM

## 2022-06-29 PROCEDURE — G8427 DOCREV CUR MEDS BY ELIG CLIN: HCPCS | Performed by: ORTHOPAEDIC SURGERY

## 2022-06-29 PROCEDURE — 3017F COLORECTAL CA SCREEN DOC REV: CPT | Performed by: ORTHOPAEDIC SURGERY

## 2022-06-29 PROCEDURE — G8756 NO BP MEASURE DOC: HCPCS | Performed by: ORTHOPAEDIC SURGERY

## 2022-06-29 PROCEDURE — G8536 NO DOC ELDER MAL SCRN: HCPCS | Performed by: ORTHOPAEDIC SURGERY

## 2022-06-29 PROCEDURE — 20600 DRAIN/INJ JOINT/BURSA W/O US: CPT | Performed by: ORTHOPAEDIC SURGERY

## 2022-06-29 PROCEDURE — 99203 OFFICE O/P NEW LOW 30 MIN: CPT | Performed by: ORTHOPAEDIC SURGERY

## 2022-06-29 PROCEDURE — 1101F PT FALLS ASSESS-DOCD LE1/YR: CPT | Performed by: ORTHOPAEDIC SURGERY

## 2022-06-29 PROCEDURE — G8417 CALC BMI ABV UP PARAM F/U: HCPCS | Performed by: ORTHOPAEDIC SURGERY

## 2022-06-29 PROCEDURE — G8432 DEP SCR NOT DOC, RNG: HCPCS | Performed by: ORTHOPAEDIC SURGERY

## 2022-06-29 PROCEDURE — 1123F ACP DISCUSS/DSCN MKR DOCD: CPT | Performed by: ORTHOPAEDIC SURGERY

## 2022-06-29 RX ORDER — TRIAMCINOLONE ACETONIDE 40 MG/ML
40 INJECTION, SUSPENSION INTRA-ARTICULAR; INTRAMUSCULAR ONCE
Status: COMPLETED | OUTPATIENT
Start: 2022-06-29 | End: 2022-06-29

## 2022-06-29 RX ADMIN — TRIAMCINOLONE ACETONIDE 40 MG: 40 INJECTION, SUSPENSION INTRA-ARTICULAR; INTRAMUSCULAR at 16:15

## 2022-06-29 NOTE — Clinical Note
6/29/2022    Patient: Zoya Fragoso   YOB: 1950   Date of Visit: 6/29/2022     Norma Nolan MD  Via     Dear Norma Nolan MD,      Thank you for referring Mr. Radha Patterson to [facility] for evaluation. My notes for this consultation are attached. If you have questions, please do not hesitate to call me. I look forward to following your patient along with you.       Sincerely,    Husam Tyler MD

## 2023-05-18 RX ORDER — FAMOTIDINE 20 MG/1
20 TABLET, FILM COATED ORAL DAILY
COMMUNITY
Start: 2020-02-04

## 2023-05-18 RX ORDER — LISINOPRIL 5 MG/1
1 TABLET ORAL DAILY
COMMUNITY
Start: 2021-01-11

## 2023-05-18 RX ORDER — TAMSULOSIN HYDROCHLORIDE 0.4 MG/1
0.4 CAPSULE ORAL DAILY
COMMUNITY
